# Patient Record
Sex: MALE | Race: OTHER | Employment: STUDENT | URBAN - METROPOLITAN AREA
[De-identification: names, ages, dates, MRNs, and addresses within clinical notes are randomized per-mention and may not be internally consistent; named-entity substitution may affect disease eponyms.]

---
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
## 2019-06-14 ENCOUNTER — OFFICE VISIT (OUTPATIENT)
Dept: FAMILY MEDICINE CLINIC | Age: 19
End: 2019-06-14
Payer: COMMERCIAL

## 2019-06-14 VITALS
TEMPERATURE: 97.9 F | BODY MASS INDEX: 36.32 KG/M2 | OXYGEN SATURATION: 99 % | HEIGHT: 67 IN | DIASTOLIC BLOOD PRESSURE: 64 MMHG | WEIGHT: 231.4 LBS | HEART RATE: 45 BPM | SYSTOLIC BLOOD PRESSURE: 110 MMHG

## 2019-06-14 DIAGNOSIS — M54.9 UPPER BACK PAIN ON LEFT SIDE: ICD-10-CM

## 2019-06-14 DIAGNOSIS — J02.9 SORE THROAT: Primary | ICD-10-CM

## 2019-06-14 LAB — S PYO AG THROAT QL: NORMAL

## 2019-06-14 PROCEDURE — 87880 STREP A ASSAY W/OPTIC: CPT | Performed by: NURSE PRACTITIONER

## 2019-06-14 PROCEDURE — 99212 OFFICE O/P EST SF 10 MIN: CPT | Performed by: NURSE PRACTITIONER

## 2019-06-14 ASSESSMENT — ENCOUNTER SYMPTOMS
ABDOMINAL PAIN: 1
SHORTNESS OF BREATH: 0
DIARRHEA: 0
SORE THROAT: 1
CONSTIPATION: 1
COUGH: 1

## 2019-06-14 NOTE — PROGRESS NOTES
Subjective  Kirstenkarla Patel, 19 y.o. male presents today with:  Chief Complaint   Patient presents with    Pharyngitis     Pt states three days ago woke up with a sore and dry  throat, thats progressively getting worse. Pt states he works at [employer] and has to talk a lot which has made him worse now it hurts to talk and when he has to talk it makes him gag. Pt state he vomited yesterday. Pt states he also has a headace on and off, nausea, diarrhea, sore muscles, and a dry cough. Pt has taken antibacterial spray, cough drops and sudafed. 18 y.o.male presents w/ sore throat. Onset 2d ago. Symptoms include dry/itchy throat and pain w/ swallowing. States symptoms have worsened to the point of vomiting x 1 yesterday - undigested food, non-bloody, non-bilious. Aggravating factors are talking. Pt states he got a cat recently, unsure if he is allergic. 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. Hx of T&A in childhood   Denies hx of seasonal allergies  Occasional cigarette smoking  At break room at work he drinks from drinking fountain, states the water tastes like chemicals. He questions whether this is related to sore throat symptoms    Review of Systems   Constitutional: Negative for chills and fever. HENT: Positive for congestion and sore throat. Negative for ear pain. Respiratory: Positive for cough. Negative for choking and shortness of breath. Cardiovascular: Negative for palpitations. Gastrointestinal: Positive for abdominal pain (\"my stomach doesn't like me very much\"), constipation (not new) and vomiting. Negative for diarrhea and nausea. Genitourinary: Negative for dysuria. Musculoskeletal: Positive for back pain. Negative for arthralgias and gait problem. Skin: Negative for rash. Neurological: Positive for headaches. Negative for dizziness and light-headedness. Hematological: Negative for adenopathy.      Objective  Vitals:    06/14/19 1258   BP: 110/64   Pulse: (!) 45   Temp: 97.9 °F (36.6 °C)   SpO2: 99%   Weight: 231 lb 6.4 oz (105 kg)   Height: 5' 7\" (1.702 m)       Physical Exam   Constitutional: He is oriented to person, place, and time. He appears well-developed and well-nourished. He is cooperative. Non-toxic appearance. HENT:   Head: Normocephalic and atraumatic. Right Ear: Hearing, tympanic membrane, external ear and ear canal normal.   Left Ear: Hearing, tympanic membrane, external ear and ear canal normal.   Nose: Mucosal edema (pale mucosa) present. No sinus tenderness. Mouth/Throat: Uvula is midline and mucous membranes are normal. No trismus in the jaw. Posterior oropharyngeal erythema (+ cobblestoning) present. No oropharyngeal exudate or posterior oropharyngeal edema. Tonsils are 0 on the right. Tonsils are 0 on the left. Eyes: Pupils are equal, round, and reactive to light. Conjunctivae, EOM and lids are normal.   Neck: Normal range of motion and phonation normal. Neck supple. Cardiovascular: Normal rate, regular rhythm, S1 normal and S2 normal.   Pulmonary/Chest: Effort normal and breath sounds normal. No respiratory distress. He has no wheezes. He has no rales. He exhibits no tenderness. Abdominal: Soft. Normal appearance and bowel sounds are normal. There is no hepatosplenomegaly. There is no tenderness. There is no CVA tenderness. No hernia. Musculoskeletal: Normal range of motion. Right shoulder: Normal.        Left shoulder: Normal.        Cervical back: Normal.        Thoracic back: Normal.   Lymphadenopathy:     He has no cervical adenopathy. Neurological: He is alert and oriented to person, place, and time. He has normal strength. No sensory deficit. Coordination and gait normal.   Skin: Skin is warm, dry and intact. No rash noted. Psychiatric: He has a normal mood and affect. His speech is normal and behavior is normal.   Vitals reviewed.     POC Testing Today:   Results for POC orders placed in visit on 06/14/19   POCT rapid strep A   Result Value Ref Range    Strep A Ag None Detected None Detected     Assessment & Plan   Negative rapid strep: Culture sent, will call if positive and prescribe antibiotics, otherwise assume viral infection and manage with supportive care. No back pain red flags on history or physical. Likely musculoskeletal in nature. Diagnoses and all orders for this visit:    Sore throat  -     POCT rapid strep A  -     Throat culture; Future  - OTC acetaminophen/ibuprofen prn pain, salt water gargles, tea w/ honey  - Drink plenty of fluids, vocal rest   - Consider antihistamine   - Call/return if symptoms fail to improve as anticipated or worsen in any way    Upper back pain on left side      - OTC acetaminophen/ibuprofen prn pain      - Gentle stretching/massage as tolerated      - Squat when lifting - do not bend at waist to lift       - Return/see PCP if symptoms fail to improve as anticipated or worsen in any way    Return if symptoms worsen or fail to improve, for Follow-up with your Primary Care Provider. Side effects and adverse effects of any medication prescribed today, as well as treatment plan/rationale, follow-up care, and result expectations have been discussed with the patient. Lexis Uriostegui expresses understanding and wishes to proceed with the plan. Discussed signs and symptoms which require immediate follow-up in ED/call to 911. Understanding verbalized. I have reviewed and updated the electronic medical record.     FRANKIE Iglesias - CNP

## 2019-06-14 NOTE — PATIENT INSTRUCTIONS
We'll with throat culture results  Drink plenty of fluids  Consider antihistamine  Vocal rest    The following comfort measures might be helpful:   Adequate rest and hydration    Over the counter pain relievers/ fever reducers as needed   For sore throat: 2 Tbsp honey mixed w/warm tea or water or warm salt water gargles (1/2 teaspoon in 8oz H20)    For congestion: Vaporizer, humidifier, steamy showers, saline nasal spray, warm facial packs, sleeping w/ head of bed elevated    Prevention measures for upper respiratory infections might include:   Avoid unfavorable environmental factors such as allergens, cigarette smoke, pollution as applicable   Frequent hand washing, cover your cough    Cover coughs and sneezes with the elbow or sleeve, not the hand     Patient Education   Sore Throat: Care Instructions  Your Care Instructions    Infection by bacteria or a virus causes most sore throats. Cigarette smoke, dry air, air pollution, allergies, and yelling can also cause a sore throat. Sore throats can be painful and annoying. Fortunately, most sore throats go away on their own. If you have a bacterial infection, your doctor may prescribe antibiotics. Follow-up care is a key part of your treatment and safety. Be sure to make and go to all appointments, and call your doctor if you are having problems. It's also a good idea to know your test results and keep a list of the medicines you take. How can you care for yourself at home? · If your doctor prescribed antibiotics, take them as directed. Do not stop taking them just because you feel better. You need to take the full course of antibiotics. · Gargle with warm salt water once an hour to help reduce swelling and relieve discomfort. Use 1 teaspoon of salt mixed in 1 cup of warm water. · Take an over-the-counter pain medicine, such as acetaminophen (Tylenol), ibuprofen (Advil, Motrin), or naproxen (Aleve). Read and follow all instructions on the label.   · Be careful when taking over-the-counter cold or flu medicines and Tylenol at the same time. Many of these medicines have acetaminophen, which is Tylenol. Read the labels to make sure that you are not taking more than the recommended dose. Too much acetaminophen (Tylenol) can be harmful. · Drink plenty of fluids. Fluids may help soothe an irritated throat. Hot fluids, such as tea or soup, may help decrease throat pain. · Use over-the-counter throat lozenges to soothe pain. Regular cough drops or hard candy may also help. These should not be given to young children because of the risk of choking. · Do not smoke or allow others to smoke around you. If you need help quitting, talk to your doctor about stop-smoking programs and medicines. These can increase your chances of quitting for good. · Use a vaporizer or humidifier to add moisture to your bedroom. Follow the directions for cleaning the machine. When should you call for help? Call your doctor now or seek immediate medical care if:    · You have new or worse trouble swallowing.     · Your sore throat gets much worse on one side.    Watch closely for changes in your health, and be sure to contact your doctor if you do not get better as expected. Where can you learn more? Go to https://[email or web address].compeHotreadereb.Keas. org and sign in to your [employer] account. Enter K966 in the [facility] box to learn more about \"Sore Throat: Care Instructions. \"     If you do not have an account, please click on the \"Sign Up Now\" link. Current as of: October 21, 2018  Content Version: 12.0  © 3765-8996 Healthwise, Incorporated. Care instructions adapted under license by [facility] ([facility]). If you have questions about a medical condition or this instruction, always ask your healthcare professional. [address] any warranty or liability for your use of this information.

## 2019-06-14 NOTE — LETTER
[address] [place] [address], [facility] [address]  [place] [id number]  Phone: [phone]  Fax: [phone]    FRANKIE Toledo - CNP        June 14, 2019     Patient: Debbie Partida   YOB: 2000   Date of Visit: 6/14/2019       To Whom it May Concern:    Debbie Partida was seen in my clinic on 6/14/2019. Please excuse his absence Saturday, June 15th. He may return to work on Sunday, June 16, 2019 if symptoms have improved. If you have any questions or concerns, please don't hesitate to call.     Sincerely,         Cece Jones DNP, APRN - CNP

## 2019-06-16 DIAGNOSIS — J02.9 SORE THROAT: ICD-10-CM

## 2019-06-18 LAB — THROAT CULTURE: NORMAL

## 2019-06-24 ASSESSMENT — ENCOUNTER SYMPTOMS
VOMITING: 1
BACK PAIN: 1
CHOKING: 0
NAUSEA: 0

## 2019-07-07 ENCOUNTER — OFFICE VISIT (OUTPATIENT)
Dept: FAMILY MEDICINE CLINIC | Age: 19
End: 2019-07-07
Payer: COMMERCIAL

## 2019-07-07 VITALS
HEIGHT: 67 IN | SYSTOLIC BLOOD PRESSURE: 122 MMHG | WEIGHT: 229.2 LBS | DIASTOLIC BLOOD PRESSURE: 78 MMHG | TEMPERATURE: 97.2 F | HEART RATE: 68 BPM | BODY MASS INDEX: 35.97 KG/M2 | OXYGEN SATURATION: 98 %

## 2019-07-07 DIAGNOSIS — H66.011 NON-RECURRENT ACUTE SUPPURATIVE OTITIS MEDIA OF RIGHT EAR WITH SPONTANEOUS RUPTURE OF TYMPANIC MEMBRANE: Primary | ICD-10-CM

## 2019-07-07 PROCEDURE — 99213 OFFICE O/P EST LOW 20 MIN: CPT | Performed by: NURSE PRACTITIONER

## 2019-07-07 RX ORDER — CEFDINIR 300 MG/1
300 CAPSULE ORAL 2 TIMES DAILY
Qty: 14 CAPSULE | Refills: 0 | Status: SHIPPED | OUTPATIENT
Start: 2019-07-07 | End: 2019-07-19 | Stop reason: SDUPTHER

## 2019-07-07 RX ORDER — OFLOXACIN 3 MG/ML
SOLUTION AURICULAR (OTIC)
Qty: 7 ML | Refills: 0 | Status: SHIPPED | OUTPATIENT
Start: 2019-07-07 | End: 2019-07-19

## 2019-07-07 ASSESSMENT — ENCOUNTER SYMPTOMS
COUGH: 1
RHINORRHEA: 1

## 2019-07-07 NOTE — PATIENT INSTRUCTIONS
the ear. ? A fever.    Watch closely for changes in your health, and be sure to contact your doctor if:    · You have changes in hearing.     · You do not get better as expected. Where can you learn more? Go to https://chpebriteweb.Libratone. org and sign in to your [employer] account. Enter F313 in the [employer] box to learn more about \"Perforated Eardrum: Care Instructions. \"     If you do not have an account, please click on the \"Sign Up Now\" link. Current as of: October 21, 2018  Content Version: 12.0  © 5475-0433 Healthwise, Incorporated. Care instructions adapted under license by [facility] ([facility]). If you have questions about a medical condition or this instruction, always ask your healthcare professional. [address] any warranty or liability for your use of this information.

## 2019-07-19 ENCOUNTER — OFFICE VISIT (OUTPATIENT)
Dept: FAMILY MEDICINE CLINIC | Age: 19
End: 2019-07-19
Payer: COMMERCIAL

## 2019-07-19 VITALS
HEIGHT: 67 IN | WEIGHT: 234 LBS | OXYGEN SATURATION: 98 % | DIASTOLIC BLOOD PRESSURE: 70 MMHG | RESPIRATION RATE: 14 BRPM | SYSTOLIC BLOOD PRESSURE: 122 MMHG | BODY MASS INDEX: 36.73 KG/M2 | HEART RATE: 50 BPM | TEMPERATURE: 97.5 F

## 2019-07-19 DIAGNOSIS — H66.011 NON-RECURRENT ACUTE SUPPURATIVE OTITIS MEDIA OF RIGHT EAR WITH SPONTANEOUS RUPTURE OF TYMPANIC MEMBRANE: ICD-10-CM

## 2019-07-19 DIAGNOSIS — F41.9 ANXIETY: ICD-10-CM

## 2019-07-19 DIAGNOSIS — F90.9 ATTENTION DEFICIT HYPERACTIVITY DISORDER (ADHD), UNSPECIFIED ADHD TYPE: Primary | ICD-10-CM

## 2019-07-19 PROCEDURE — 99214 OFFICE O/P EST MOD 30 MIN: CPT | Performed by: INTERNAL MEDICINE

## 2019-07-19 PROCEDURE — 4004F PT TOBACCO SCREEN RCVD TLK: CPT | Performed by: INTERNAL MEDICINE

## 2019-07-19 PROCEDURE — G8427 DOCREV CUR MEDS BY ELIG CLIN: HCPCS | Performed by: INTERNAL MEDICINE

## 2019-07-19 PROCEDURE — G8417 CALC BMI ABV UP PARAM F/U: HCPCS | Performed by: INTERNAL MEDICINE

## 2019-07-19 RX ORDER — CEFDINIR 300 MG/1
300 CAPSULE ORAL 2 TIMES DAILY
Qty: 14 CAPSULE | Refills: 0 | Status: SHIPPED | OUTPATIENT
Start: 2019-07-19 | End: 2019-07-26

## 2019-07-19 ASSESSMENT — ENCOUNTER SYMPTOMS
EYE REDNESS: 0
SHORTNESS OF BREATH: 0
SORE THROAT: 0
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
## 2019-07-25 ASSESSMENT — ENCOUNTER SYMPTOMS
VOMITING: 0
ABDOMINAL PAIN: 0
PHOTOPHOBIA: 0
NAUSEA: 0
CHOKING: 0
TROUBLE SWALLOWING: 0
VOICE CHANGE: 0
SHORTNESS OF BREATH: 0

## 2019-07-26 ENCOUNTER — OFFICE VISIT (OUTPATIENT)
Dept: BEHAVIORAL/MENTAL HEALTH CLINIC | Age: 19
End: 2019-07-26
Payer: COMMERCIAL

## 2019-07-26 DIAGNOSIS — F34.1 PERSISTENT DEPRESSIVE DISORDER WITH ANXIOUS DISTRESS, CURRENTLY MODERATE: Primary | ICD-10-CM

## 2019-07-26 PROCEDURE — 4004F PT TOBACCO SCREEN RCVD TLK: CPT | Performed by: PSYCHOLOGIST

## 2019-07-26 PROCEDURE — 90791 PSYCH DIAGNOSTIC EVALUATION: CPT | Performed by: PSYCHOLOGIST

## 2019-07-26 ASSESSMENT — PATIENT HEALTH QUESTIONNAIRE - PHQ9
SUM OF ALL RESPONSES TO PHQ QUESTIONS 1-9: 12
1. LITTLE INTEREST OR PLEASURE IN DOING THINGS: 1
2. FEELING DOWN, DEPRESSED OR HOPELESS: 2
4. FEELING TIRED OR HAVING LITTLE ENERGY: 2
3. TROUBLE FALLING OR STAYING ASLEEP: 2
9. THOUGHTS THAT YOU WOULD BE BETTER OFF DEAD, OR OF HURTING YOURSELF: 0
6. FEELING BAD ABOUT YOURSELF - OR THAT YOU ARE A FAILURE OR HAVE LET YOURSELF OR YOUR FAMILY DOWN: 2
10. IF YOU CHECKED OFF ANY PROBLEMS, HOW DIFFICULT HAVE THESE PROBLEMS MADE IT FOR YOU TO DO YOUR WORK, TAKE CARE OF THINGS AT HOME, OR GET ALONG WITH OTHER PEOPLE: 2
7. TROUBLE CONCENTRATING ON THINGS, SUCH AS READING THE NEWSPAPER OR WATCHING TELEVISION: 1
5. POOR APPETITE OR OVEREATING: 2
SUM OF ALL RESPONSES TO PHQ9 QUESTIONS 1 & 2: 3
8. MOVING OR SPEAKING SO SLOWLY THAT OTHER PEOPLE COULD HAVE NOTICED. OR THE OPPOSITE, BEING SO FIGETY OR RESTLESS THAT YOU HAVE BEEN MOVING AROUND A LOT MORE THAN USUAL: 0
SUM OF ALL RESPONSES TO PHQ QUESTIONS 1-9: 12

## 2019-07-26 NOTE — PATIENT INSTRUCTIONS
1. Dedicate 5 minutes 3x/day to practice the deep breathing    2. Review the list of apps and give some a try! [facility] Box, CBTi  and progressive muscle relaxation for sleep, etc.)    3. Read the below information about stress management    4. Return in about 2-3 weeks    \"The entire autonomic nervous system (and through it, our internal organs and glands) is largely driven by our breathing patterns. By changing our breathing we can influence millions of biochemical reactions in our body, producing more relaxing substances such as endorphins and fewer anxiety-producing ones like adrenaline and higher blood acidity. Mindfulness of the breath is so effective that it is common to all meditative and prayer traditions. \" Anxiety Fear & Breathing - Breathing. com    \"When overcoming high levels of anxiety, it is important to learn the techniques of correct breathing. Many people who live with high levels of anxiety are known to breathe through their chest. Shallow breathing through the chest means you are disrupting the balance of oxygen and carbon dioxide necessary to be in a relaxed state. This type of breathing will perpetuate the symptoms of anxiety. \" [employer]Place. com      Diaphragmatic Breathing             _____________________________________________________________________________  1. Sit in a comfortable position    2. Place one hand on your stomach and the other on your chest    3. Try to breathe so that only your stomach rises and falls    As you inhale, concentrate on your chest remaining relatively still while your stomach rises. It may be helpful for you to imagine that your pants are too big and you need to push your stomach out to hold them up. When exhaling, allow your stomach to fall in and the air to fully escape. Inhale slowly. You may choose to hold the air in for about a second. Exhale slowly.   Dont push the air out, but just let the natural pressure of your body slowly move it the fermins guided breathing instruction. Mable Cuevas learn how to breathe deeply, hold, and then release with better control. If it works for you, you can purchase the full course which gives you access to the entire program.  Breathing Zone   Platform: [employer] & Android  Cost: $3.99   Breathing Zone uses a clinically proven therapeutic breathing exercise that decreases your heart rate, and with daily use can help manage high blood pressure.    ----------------------------------------------  Self-Help for Anxiety Management (ROMEO)  Platform: Lien [employer]one & Android  Cost: Free  The Self-Help for Anxiety Management (ROMEO) fermin from the [employer] can help you regain control of your anxiety and emotions. Tell the fermin how youre feeling, how anxious you are, or how worried you are. Then let the fermins self-help features walk you through some calming or relaxation practices. If you want, you can connect with a social network of other [facility] users. Dont worry, the network isnt connected to larger networks like Twitter or [employer]. Stop Panic & Anxiety Help  Platform: Android  Cost: Free  If panic and anxiety attacks have a  on your life, this fermin might help you let them go. The Stop Panic & Anxiety Help Android fermin uses emotion and relaxation training audio tracks to help you fight your fears and find a state of calm. When youve overcome the attack, use the 51 Auto journal to record what caused the attack and how you were able to get through it. Then use this journal to learn from your experiences and prepare for the future. I Can Be Fearless by Human Progress  Platform: [employer]  Cost: Free  When you were younger, your parents might have told you that you could do anything you put your mind to. This fermin might not help you be an astronaut or a world famous actress, but it can help you break through your anxiety, fears, and worries to a place of calm and confidence.  Open your Apple device and select what you want than 20 years of experience. In addition to viewing Dr. Oneil Degroot videos, you can read a variety of articles related to anxiety, meditation, and stress management. Anxiety Free  Platform: [employer]   Cost: Free  Meditation requires mindfulness and a sense of presence in your thoughts. Hypnosis is one step beyond meditation. It works by sending signals to your brain and transforming it almost unknowingly. The creators of this fermin say that its audio recordings contain subliminal signals that speak to the subconscious with powerful effect.  Hypnosis, like meditation, requires practice, and the goal is to get each user to a place where self-hypnosis is possible in order to reduce anxiety. Relax & Rest Guided Meditations  Platform: [employer] and [employer]  Cost: $0.99  While group meetings and discussions are always an option, some people find relaxation more easily on their own. This fermin lets you relax in the space of your own home or office with three guided meditations. Breath Awareness Guided Meditation (5 minutes), Deep Rested Guided Meditation (13 minutes), and Whole Body Guided Relaxation (24 minutes) are designed specifically to help you relax and sink into a peaceful meditation moment. Equanimity - Meditation Timer & Tracker  Platform: [employer]   Cost: $4.99  Meditation is one way you can cope with the symptoms and side effects of anxiety. People who meditate can eventually train themselves to stay calm when feeling stressed or anxious. Equanimity - Meditation Timer & Tracker is simple and straightforward. Time each session and watch for visual light cues to let you know how long youve been meditating. Take notes about each session, and watch your progress as you learn to manage your stress and anxiety.   Virtual Hope Box  Platform: iPhone and Android  Cost: Free  The Automatic Data Box (VHB) is a smartphone application that contains simple tools to help with coping, relaxation, distraction, and positive thinking via

## 2020-08-10 ENCOUNTER — NURSE TRIAGE (OUTPATIENT)
Dept: OTHER | Facility: CLINIC | Age: 20
End: 2020-08-10

## 2020-08-10 NOTE — TELEPHONE ENCOUNTER
Reason for Disposition   MILD diarrhea (e.g., 1-3 or more stools than normal in past 24 hours) diarrhea without known cause and present > 7 days    Protocols used: DIARRHEA-ADULT-OH    Received call from [facility]. Caller reports abdominal cramping and diarrhea for the past 10 days. Caller reports after he eats is when he experiences this discomfort. Provided care advice to help with symptoms. Call soft transferred to [address] to schedule appointment. Please do not reply to the triage nurse through this encounter. Any subsequent communication should be directly with the patient.

## 2020-08-11 ENCOUNTER — VIRTUAL VISIT (OUTPATIENT)
Dept: PRIMARY CARE CLINIC | Age: 20
End: 2020-08-11
Payer: COMMERCIAL

## 2020-08-11 DIAGNOSIS — K52.9 ACUTE GASTROENTERITIS: ICD-10-CM

## 2020-08-11 LAB
ALBUMIN SERPL-MCNC: 4.8 G/DL (ref 3.5–4.6)
ALP BLD-CCNC: 60 U/L (ref 35–104)
ALT SERPL-CCNC: 28 U/L (ref 0–41)
ANION GAP SERPL CALCULATED.3IONS-SCNC: 10 MEQ/L (ref 9–15)
AST SERPL-CCNC: 18 U/L (ref 0–40)
BILIRUB SERPL-MCNC: 0.5 MG/DL (ref 0.2–0.7)
BUN BLDV-MCNC: 9 MG/DL (ref 6–20)
CALCIUM SERPL-MCNC: 9.3 MG/DL (ref 8.5–9.9)
CHLORIDE BLD-SCNC: 104 MEQ/L (ref 95–107)
CO2: 27 MEQ/L (ref 20–31)
CREAT SERPL-MCNC: 0.8 MG/DL (ref 0.7–1.2)
GFR AFRICAN AMERICAN: >60
GFR NON-AFRICAN AMERICAN: >60
GLOBULIN: 2.8 G/DL (ref 2.3–3.5)
GLUCOSE BLD-MCNC: 110 MG/DL (ref 70–99)
HCT VFR BLD CALC: 44.4 % (ref 42–52)
HEMOGLOBIN: 15.4 G/DL (ref 14–18)
MCH RBC QN AUTO: 30.9 PG (ref 27–31.3)
MCHC RBC AUTO-ENTMCNC: 34.8 % (ref 33–37)
MCV RBC AUTO: 88.8 FL (ref 80–100)
PDW BLD-RTO: 12.3 % (ref 11.5–14.5)
PLATELET # BLD: 262 K/UL (ref 130–400)
POTASSIUM SERPL-SCNC: 4.1 MEQ/L (ref 3.4–4.9)
RBC # BLD: 5 M/UL (ref 4.7–6.1)
SODIUM BLD-SCNC: 141 MEQ/L (ref 135–144)
TOTAL PROTEIN: 7.6 G/DL (ref 6.3–8)
WBC # BLD: 7 K/UL (ref 4.8–10.8)

## 2020-08-11 PROCEDURE — 99442 PR PHYS/QHP TELEPHONE EVALUATION 11-20 MIN: CPT | Performed by: INTERNAL MEDICINE

## 2020-08-11 NOTE — PROGRESS NOTES
Na Dunbar is a 23 y.o. male evaluated via telephone on 8/11/2020. Consent:  He and/or health care decision maker is aware that that he may receive a bill for this telephone service, depending on his insurance coverage, and has provided verbal consent to proceed: Yes    Documentation:  I communicated with the patient and/or health care decision maker. Details of this discussion including any medical advice provided:    Diarrhea x 11 days  Pt complains of non bloody watery stools occurring 3-5 times per day. Assoc nausea, no vomiting and generalized abd soreness. No fever or chills, no constipation. Claims to have consumed recalled Oyster onions with his dad who also had a milder form of diarrhea. No recent antibiotic use. Assoc muscle weakness and cramps. Has been consuming only water for replacement. Pt encouraged to drink Gatorade. I affirm this is a Patient Initiated Episode with a Patient who has not had a related appointment within my department in the past 7 days or scheduled within the next 24 hours.     Patient identification was verified at the start of the visit: Yes    Total Time: minutes: 11-20 minutes    Note: not billable if this call serves to triage the patient into an appointment for the relevant concern    [facility]

## 2021-10-01 ENCOUNTER — OFFICE VISIT (OUTPATIENT)
Dept: PRIMARY CARE CLINIC | Age: 21
End: 2021-10-01
Payer: COMMERCIAL

## 2021-10-01 VITALS
HEART RATE: 92 BPM | HEIGHT: 67 IN | BODY MASS INDEX: 35.47 KG/M2 | WEIGHT: 226 LBS | SYSTOLIC BLOOD PRESSURE: 158 MMHG | DIASTOLIC BLOOD PRESSURE: 94 MMHG | OXYGEN SATURATION: 98 %

## 2021-10-01 DIAGNOSIS — F40.01 AGORAPHOBIA WITH PANIC ATTACKS: Primary | ICD-10-CM

## 2021-10-01 DIAGNOSIS — Z00.00 PREVENTATIVE HEALTH CARE: ICD-10-CM

## 2021-10-01 DIAGNOSIS — E03.9 HYPOTHYROIDISM, UNSPECIFIED TYPE: ICD-10-CM

## 2021-10-01 DIAGNOSIS — E78.5 HYPERLIPIDEMIA, UNSPECIFIED HYPERLIPIDEMIA TYPE: ICD-10-CM

## 2021-10-01 DIAGNOSIS — A64 STD (MALE): ICD-10-CM

## 2021-10-01 DIAGNOSIS — F32.A DEPRESSION, UNSPECIFIED DEPRESSION TYPE: ICD-10-CM

## 2021-10-01 PROCEDURE — 99214 OFFICE O/P EST MOD 30 MIN: CPT | Performed by: INTERNAL MEDICINE

## 2021-10-01 RX ORDER — FLUOXETINE 10 MG/1
10 CAPSULE ORAL DAILY
Qty: 30 CAPSULE | Refills: 5 | Status: SHIPPED | OUTPATIENT
Start: 2021-10-01 | End: 2021-10-26 | Stop reason: SDUPTHER

## 2021-10-01 RX ORDER — ONDANSETRON 8 MG/1
TABLET, ORALLY DISINTEGRATING ORAL
COMMUNITY
Start: 2021-08-26 | End: 2022-01-31

## 2021-10-01 SDOH — ECONOMIC STABILITY: TRANSPORTATION INSECURITY
IN THE PAST 12 MONTHS, HAS LACK OF TRANSPORTATION KEPT YOU FROM MEETINGS, WORK, OR FROM GETTING THINGS NEEDED FOR DAILY LIVING?: NO

## 2021-10-01 SDOH — ECONOMIC STABILITY: FOOD INSECURITY: WITHIN THE PAST 12 MONTHS, THE FOOD YOU BOUGHT JUST DIDN'T LAST AND YOU DIDN'T HAVE MONEY TO GET MORE.: NEVER TRUE

## 2021-10-01 SDOH — ECONOMIC STABILITY: TRANSPORTATION INSECURITY
IN THE PAST 12 MONTHS, HAS THE LACK OF TRANSPORTATION KEPT YOU FROM MEDICAL APPOINTMENTS OR FROM GETTING MEDICATIONS?: NO

## 2021-10-01 SDOH — ECONOMIC STABILITY: FOOD INSECURITY: WITHIN THE PAST 12 MONTHS, YOU WORRIED THAT YOUR FOOD WOULD RUN OUT BEFORE YOU GOT MONEY TO BUY MORE.: NEVER TRUE

## 2021-10-01 ASSESSMENT — ENCOUNTER SYMPTOMS
TROUBLE SWALLOWING: 0
VOMITING: 0
CHOKING: 0
PHOTOPHOBIA: 0
VOICE CHANGE: 0
NAUSEA: 0
SHORTNESS OF BREATH: 0

## 2021-10-01 ASSESSMENT — SOCIAL DETERMINANTS OF HEALTH (SDOH): HOW HARD IS IT FOR YOU TO PAY FOR THE VERY BASICS LIKE FOOD, HOUSING, MEDICAL CARE, AND HEATING?: NOT HARD AT ALL

## 2021-10-01 NOTE — PROGRESS NOTES
Peter Reed 19 y.o. male presents today with   Chief Complaint   Patient presents with    Anxiety     unable to speak to people on the job. Recently quit smoking marijuana to get better job    Sexually Transmitted Diseases     would like to be tested       Sexually Transmitted Diseases  Pertinent negatives include no chest pain, fever, nausea, rash, shortness of breath, urgency or vomiting. Mental Health Problem  The primary symptoms include dysphoric mood and somatic symptoms. The current episode started more than 1 month ago. This is a recurrent problem. Somatic symptoms do not include fatigue. The onset of the illness is precipitated by emotional stress and a stressful event. The degree of incapacity that he is experiencing as a consequence of his illness is moderate. Additional symptoms of the illness include anhedonia, insomnia, agitation and attention impairment. Additional symptoms of the illness do not include fatigue. He does not admit to suicidal ideas. He does not contemplate harming himself. Risk factors that are present for mental illness include a history of mental illness. inbetween relationship    Past Medical History:   Diagnosis Date    (PE) tubes X 5 9/2/2015    Acne vulgaris 10/14/2015    AGE (acute gastroenteritis), Resolved 6/30/2014    Allergic rhinitis 5/19/2014    Depression 10/14/2015    Eczema 5/19/2014    Headache 3/16/2016    LVH (left ventricular hypertrophy) 5/19/2014    LVH, Cardiac w/u neg. 5/19/2014    Nodule, ? Small Abscess 3/3/2015    Noncompliance, hasn't made appt w/ Psychiatry yet. Refuses antidepressants 11/9/2017    Noncompliance, hasn't picked up Lexapro yet or made appt w/ Psychiatry yet 10/21/2015    Patent pressure equalisation (PE) tube, left 9/3/2015    Scrotal mass, normal now w/ Normal exam 3/16/2016     Patient Active Problem List    Diagnosis Date Noted    Noncompliance, hasn't made appt w/ Psychiatry yet.  Refuses antidepressants 11/09/2017    Scrotal mass, normal now w/ Normal exam 03/16/2016    Headache 03/16/2016    Acne vulgaris 10/14/2015    Depression 10/14/2015    Patent pressure equalisation (PE) tube, left 09/03/2015    (PE) tubes X 5 09/02/2015    Nodule, ? Small Abscess 03/03/2015    AGE (acute gastroenteritis), Resolved 06/30/2014    Allergic rhinitis 05/19/2014    Eczema 05/19/2014    LVH, Cardiac w/u neg. 05/19/2014     Past Surgical History:   Procedure Laterality Date    COLONOSCOPY  12/31/2014    DR Mitchel Casey     No family history on file. Social History     Socioeconomic History    Marital status: Single     Spouse name: None    Number of children: None    Years of education: None    Highest education level: None   Occupational History    None   Tobacco Use    Smoking status: Current Every Day Smoker    Smokeless tobacco: Never Used   Substance and Sexual Activity    Alcohol use: No    Drug use: No    Sexual activity: Yes     Partners: Female   Other Topics Concern    None   Social History Narrative    None     Social Determinants of Health     Financial Resource Strain: Low Risk     Difficulty of Paying Living Expenses: Not hard at all   Food Insecurity: No Food Insecurity    Worried About Running Out of Food in the Last Year: Never true    [address] in the Last Year: Never true   Transportation Needs: No Transportation Needs    Lack of Transportation (Medical): No    Lack of Transportation (Non-Medical):  No   Physical Activity:     Days of Exercise per Week:     Minutes of Exercise per Session:    Stress:     Feeling of Stress :    Social Connections:     Frequency of Communication with Friends and Family:     Frequency of Social Gatherings with Friends and Family:     Attends Shinto Services:     Active Member of Clubs or Organizations:     Attends Club or Organization Meetings:     Marital Status:    Intimate Partner Violence:     Fear of Current or Ex-Partner:     Emotionally Abused:     Physically Abused:     Sexually Abused: Allergies   Allergen Reactions    Lactose Intolerance (Gi) Diarrhea    Penicillins Rash       Review of Systems   Constitutional: Negative for fatigue and fever. HENT: Negative for trouble swallowing and voice change. Eyes: Negative for photophobia and visual disturbance. Respiratory: Negative for choking and shortness of breath. Cardiovascular: Negative for chest pain and palpitations. Gastrointestinal: Negative for nausea and vomiting. Genitourinary: Negative for decreased urine volume and urgency. Skin: Negative for rash. Neurological: Negative for tremors and syncope. Hematological: Does not bruise/bleed easily. Psychiatric/Behavioral: Positive for agitation, decreased concentration, dysphoric mood and sleep disturbance. Negative for suicidal ideas. The patient is nervous/anxious and has insomnia. Vitals:    10/01/21 1406   BP: (!) 158/94   Site: Left Upper Arm   Cuff Size: Large Adult   Pulse: 92   SpO2: 98%   Weight: 226 lb (102.5 kg)   Height: 5' 7\" (1.702 m)       Physical Exam  Constitutional:       Appearance: He is well-developed. HENT:      Head: Normocephalic and atraumatic. Eyes:      Conjunctiva/sclera: Conjunctivae normal.      Pupils: Pupils are equal, round, and reactive to light. Cardiovascular:      Rate and Rhythm: Normal rate and regular rhythm. Pulmonary:      Effort: Pulmonary effort is normal. No respiratory distress. Breath sounds: No wheezing. Abdominal:      General: Bowel sounds are normal. There is no distension. Tenderness: There is no abdominal tenderness. Musculoskeletal:         General: Normal range of motion. Cervical back: Normal range of motion. Skin:     General: Skin is warm. Coloration: Skin is not jaundiced. Neurological:      Mental Status: He is alert. Cranial Nerves: No cranial nerve deficit.    Psychiatric:         Mood and Affect: Mood normal.       Assessment/Plan  Franco Pepper was seen today for anxiety and sexually transmitted diseases. Diagnoses and all orders for this visit:    Agoraphobia with panic attacks  -     FLUoxetine (PROZAC) 10 MG capsule; Take 1 capsule by mouth daily  -     Ambulatory referral to Psychology    Depression, unspecified depression type  -     CBC With Auto Differential; Future  -     FLUoxetine (PROZAC) 10 MG capsule; Take 1 capsule by mouth daily  -     Ambulatory referral to Psychology    Hypothyroidism, unspecified type  -     TSH with Reflex; Future  -     T4, Free; Future    Hyperlipidemia, unspecified hyperlipidemia type  -     Lipid, Fasting; Future  -     Comprehensive Metabolic Panel; Future    Preventative health care  -     Lipid, Fasting; Future  -     CBC With Auto Differential; Future  -     Comprehensive Metabolic Panel; Future    STD (male)  -     HIV Screen; Future  -     C.trachomatis N.gonorrhoeae DNA, Urine; Future  -     Rpr; Future  -     Hepatitis C Antibody; Future        Return in about 3 months (around 1/1/2022), or if symptoms worsen or fail to improve.     Leyla Mcclendon MD

## 2021-10-26 ENCOUNTER — TELEPHONE (OUTPATIENT)
Dept: PRIMARY CARE CLINIC | Age: 21
End: 2021-10-26

## 2021-10-26 DIAGNOSIS — F32.A DEPRESSION, UNSPECIFIED DEPRESSION TYPE: ICD-10-CM

## 2021-10-26 DIAGNOSIS — F40.01 AGORAPHOBIA WITH PANIC ATTACKS: ICD-10-CM

## 2021-10-26 RX ORDER — FLUOXETINE HYDROCHLORIDE 20 MG/1
20 CAPSULE ORAL DAILY
Qty: 30 CAPSULE | Refills: 2 | Status: SHIPPED | OUTPATIENT
Start: 2021-10-26 | End: 2022-01-18 | Stop reason: SDUPTHER

## 2022-01-18 ENCOUNTER — TELEPHONE (OUTPATIENT)
Dept: PRIMARY CARE CLINIC | Age: 22
End: 2022-01-18

## 2022-01-18 DIAGNOSIS — F40.01 AGORAPHOBIA WITH PANIC ATTACKS: ICD-10-CM

## 2022-01-18 DIAGNOSIS — F32.A DEPRESSION, UNSPECIFIED DEPRESSION TYPE: ICD-10-CM

## 2022-01-18 RX ORDER — FLUOXETINE HYDROCHLORIDE 40 MG/1
40 CAPSULE ORAL DAILY
Qty: 30 CAPSULE | Refills: 2 | Status: SHIPPED | OUTPATIENT
Start: 2022-01-18 | End: 2022-01-31 | Stop reason: SDUPTHER

## 2022-01-18 NOTE — TELEPHONE ENCOUNTER
Patient notified and he has not seen the Psych doctor recently and could not remember if he did the labs?

## 2022-01-18 NOTE — TELEPHONE ENCOUNTER
He is asking for an increase on his Prozac. He has been taking 20 mg daily and this is not strong enough?

## 2022-01-31 ENCOUNTER — TELEPHONE (OUTPATIENT)
Dept: PRIMARY CARE CLINIC | Age: 22
End: 2022-01-31

## 2022-01-31 ENCOUNTER — OFFICE VISIT (OUTPATIENT)
Dept: PRIMARY CARE CLINIC | Age: 22
End: 2022-01-31
Payer: COMMERCIAL

## 2022-01-31 VITALS
TEMPERATURE: 98.1 F | RESPIRATION RATE: 18 BRPM | BODY MASS INDEX: 36.88 KG/M2 | OXYGEN SATURATION: 98 % | SYSTOLIC BLOOD PRESSURE: 138 MMHG | HEART RATE: 75 BPM | HEIGHT: 67 IN | DIASTOLIC BLOOD PRESSURE: 74 MMHG | WEIGHT: 235 LBS

## 2022-01-31 DIAGNOSIS — F43.21 GRIEF: ICD-10-CM

## 2022-01-31 DIAGNOSIS — F32.A DEPRESSION, UNSPECIFIED DEPRESSION TYPE: Primary | ICD-10-CM

## 2022-01-31 DIAGNOSIS — F32.A DEPRESSION, UNSPECIFIED DEPRESSION TYPE: ICD-10-CM

## 2022-01-31 DIAGNOSIS — F40.01 AGORAPHOBIA WITH PANIC ATTACKS: ICD-10-CM

## 2022-01-31 PROCEDURE — 99214 OFFICE O/P EST MOD 30 MIN: CPT | Performed by: INTERNAL MEDICINE

## 2022-01-31 RX ORDER — FLUOXETINE HYDROCHLORIDE 40 MG/1
40 CAPSULE ORAL DAILY
Qty: 30 CAPSULE | Refills: 2 | Status: SHIPPED | OUTPATIENT
Start: 2022-01-31

## 2022-01-31 RX ORDER — HYDROXYZINE PAMOATE 25 MG/1
25 CAPSULE ORAL 3 TIMES DAILY PRN
Qty: 30 CAPSULE | Refills: 1 | Status: SHIPPED | OUTPATIENT
Start: 2022-01-31

## 2022-01-31 ASSESSMENT — COLUMBIA-SUICIDE SEVERITY RATING SCALE - C-SSRS
1. WITHIN THE PAST MONTH, HAVE YOU WISHED YOU WERE DEAD OR WISHED YOU COULD GO TO SLEEP AND NOT WAKE UP?: YES
6. HAVE YOU EVER DONE ANYTHING, STARTED TO DO ANYTHING, OR PREPARED TO DO ANYTHING TO END YOUR LIFE?: NO
2. HAVE YOU ACTUALLY HAD ANY THOUGHTS OF KILLING YOURSELF?: NO

## 2022-01-31 ASSESSMENT — PATIENT HEALTH QUESTIONNAIRE - PHQ9
2. FEELING DOWN, DEPRESSED OR HOPELESS: 2
SUM OF ALL RESPONSES TO PHQ QUESTIONS 1-9: 20
SUM OF ALL RESPONSES TO PHQ QUESTIONS 1-9: 18
7. TROUBLE CONCENTRATING ON THINGS, SUCH AS READING THE NEWSPAPER OR WATCHING TELEVISION: 2
4. FEELING TIRED OR HAVING LITTLE ENERGY: 3
8. MOVING OR SPEAKING SO SLOWLY THAT OTHER PEOPLE COULD HAVE NOTICED. OR THE OPPOSITE, BEING SO FIGETY OR RESTLESS THAT YOU HAVE BEEN MOVING AROUND A LOT MORE THAN USUAL: 3
6. FEELING BAD ABOUT YOURSELF - OR THAT YOU ARE A FAILURE OR HAVE LET YOURSELF OR YOUR FAMILY DOWN: 3
5. POOR APPETITE OR OVEREATING: 3
10. IF YOU CHECKED OFF ANY PROBLEMS, HOW DIFFICULT HAVE THESE PROBLEMS MADE IT FOR YOU TO DO YOUR WORK, TAKE CARE OF THINGS AT HOME, OR GET ALONG WITH OTHER PEOPLE: 3
SUM OF ALL RESPONSES TO PHQ QUESTIONS 1-9: 20
9. THOUGHTS THAT YOU WOULD BE BETTER OFF DEAD, OR OF HURTING YOURSELF: 2
SUM OF ALL RESPONSES TO PHQ QUESTIONS 1-9: 20
3. TROUBLE FALLING OR STAYING ASLEEP: 2

## 2022-01-31 ASSESSMENT — ENCOUNTER SYMPTOMS
VOMITING: 0
DIARRHEA: 0
COUGH: 0
ABDOMINAL PAIN: 0
WHEEZING: 0
NAUSEA: 0
SHORTNESS OF BREATH: 0

## 2022-01-31 NOTE — TELEPHONE ENCOUNTER
----- Message from Sydney Meier sent at 1/31/2022  3:18 PM EST -----  Subject: Medication Problem    QUESTIONS  Name of Medication? FLUoxetine (PROZAC) 40 MG capsule  Patient-reported dosage and instructions? 40 MG capsule, Take 1 capsule by   mouth daily  What question or problem do you have with the medication? refill was not   sent to pharmacy  Preferred Pharmacy? [facility] [address],   [facility] [phone] - F [phone]  Pharmacy phone number (if available)? [phone]  Additional Information for Provider?   ---------------------------------------------------------------------------  --------------  [address]  What is the best way for the office to contact you? OK to leave message on   voicemail  Preferred Call Back Phone Number? [phone]  ---------------------------------------------------------------------------  --------------  SCRIPT ANSWERS  Relationship to Patient?  Self

## 2022-01-31 NOTE — TELEPHONE ENCOUNTER
----- Message from Simona Akins sent at 1/28/2022 11:13 AM EST -----  Subject: Appointment Request    Reason for Call: Urgent Mental Health    QUESTIONS  Type of Appointment? Established Patient  Reason for appointment request? No appointments available during search  Additional Information for Provider? Pt would like to make appt.   ---------------------------------------------------------------------------  --------------  CALL BACK INFO  What is the best way for the office to contact you? OK to leave message on   voicemail  Preferred Call Back Phone Number? [phone]  ---------------------------------------------------------------------------  --------------  SCRIPT ANSWERS  Relationship to Patient? Self  Are you having thoughts of hurting yourself or others? No  Is this a follow up of prior diagnosis? No  Have you been diagnosed with, awaiting test results for, or told that you   are suspected of having COVID-19 (Coronavirus)? (If patient has tested   negative or was tested as a requirement for work, school, or travel and   not based on symptoms, answer no)? No  Within the past two weeks have you developed any of the following symptoms   (answer no if symptoms have been present longer than 2 weeks or began   more than 2 weeks ago)? Fever or Chills, Cough, Shortness of breath or   difficulty breathing, Loss of taste or smell, Sore throat, Nasal   congestion, Sneezing or runny nose, Fatigue or generalized body aches   (answer no if pain is specific to a body part e.g. back pain), Diarrhea,   Headache? No  Have you had close contact with someone with COVID-19 in the last 14 days? No  (Service Expert  click yes below to proceed with [facility]   Scheduling)?  Yes

## 2022-01-31 NOTE — PROGRESS NOTES
Subjective:      Patient ID: Jasmin Lazo is a 19 y.o. male    Depression and anxiety x 10 days   HPI  Pt presents for follow up regarding management of anxiety and depression. Yet to commence Prozac 40mg (still on 20mg). Depression and anxiety are uncontrolled due to recent loss of a close loved one and miscarriage of brother's child. Increased panic attacks. Past Medical History:   Diagnosis Date    (PE) tubes X 5 9/2/2015    Acne vulgaris 10/14/2015    AGE (acute gastroenteritis), Resolved 6/30/2014    Allergic rhinitis 5/19/2014    Depression 10/14/2015    Eczema 5/19/2014    Headache 3/16/2016    LVH (left ventricular hypertrophy) 5/19/2014    LVH, Cardiac w/u neg. 5/19/2014    Nodule, ? Small Abscess 3/3/2015    Noncompliance, hasn't made appt w/ Psychiatry yet.  Refuses antidepressants 11/9/2017    Noncompliance, hasn't picked up Lexapro yet or made appt w/ Psychiatry yet 10/21/2015    Patent pressure equalisation (PE) tube, left 9/3/2015    Scrotal mass, normal now w/ Normal exam 3/16/2016     Past Surgical History:   Procedure Laterality Date    COLONOSCOPY  12/31/2014    DR Ang Mckay     Social History     Socioeconomic History    Marital status: Single     Spouse name: Not on file    Number of children: Not on file    Years of education: Not on file    Highest education level: Not on file   Occupational History    Not on file   Tobacco Use    Smoking status: Current Every Day Smoker    Smokeless tobacco: Never Used   Substance and Sexual Activity    Alcohol use: No    Drug use: No    Sexual activity: Yes     Partners: Female   Other Topics Concern    Not on file   Social History Narrative    Not on file     Social Determinants of Health     Financial Resource Strain: Low Risk     Difficulty of Paying Living Expenses: Not hard at all   Food Insecurity: No Food Insecurity    Worried About Running Out of Food in the Last Year: Never true    [place] of Food in the Last Year: Never true   Transportation Needs: No Transportation Needs    Lack of Transportation (Medical): No    Lack of Transportation (Non-Medical): No   Physical Activity:     Days of Exercise per Week: Not on file    Minutes of Exercise per Session: Not on file   Stress:     Feeling of Stress : Not on file   Social Connections:     Frequency of Communication with Friends and Family: Not on file    Frequency of Social Gatherings with Friends and Family: Not on file    Attends Mosque Services: Not on file    Active Member of [address] [place] [address] [employer] or Organizations: Not on file    Attends Club or Organization Meetings: Not on file    Marital Status: Not on file   Intimate Partner Violence:     Fear of Current or Ex-Partner: Not on file    Emotionally Abused: Not on file    Physically Abused: Not on file    Sexually Abused: Not on file   Housing Stability:     Unable to Pay for Housing in the Last Year: Not on file    Number of [place] in the Last Year: Not on file    Unstable Housing in the Last Year: Not on file     History reviewed. No pertinent family history. Allergies:  Lactose intolerance (gi) and Penicillins  Patient Active Problem List   Diagnosis    Allergic rhinitis    Eczema    LVH, Cardiac w/u neg.  AGE (acute gastroenteritis), Resolved    Nodule, ? Small Abscess    (PE) tubes X 5    Patent pressure equalisation (PE) tube, left    Acne vulgaris    Depression    Scrotal mass, normal now w/ Normal exam    Headache    Noncompliance, hasn't made appt w/ Psychiatry yet. Refuses antidepressants     Current Outpatient Medications on File Prior to Visit   Medication Sig Dispense Refill    FLUoxetine (PROZAC) 40 MG capsule Take 1 capsule by mouth daily 30 capsule 2     No current facility-administered medications on file prior to visit. Review of Systems   Constitutional: Negative for chills, diaphoresis, fatigue and fever. Respiratory: Negative for cough, shortness of breath and wheezing. Cardiovascular: Negative for chest pain. Gastrointestinal: Negative for abdominal pain, diarrhea, nausea and vomiting. Endocrine: Negative for cold intolerance and heat intolerance. Genitourinary: Negative for dysuria and frequency. Neurological: Negative for dizziness and light-headedness. Psychiatric/Behavioral: Positive for agitation and dysphoric mood. Negative for sleep disturbance and suicidal ideas. The patient is not nervous/anxious. Objective:   /74   Pulse 75   Temp 98.1 °F (36.7 °C)   Resp 18   Ht 5' 7\" (1.702 m)   Wt 235 lb (106.6 kg)   SpO2 98%   BMI 36.81 kg/m²     Physical Exam  Constitutional:       General: He is not in acute distress. Appearance: He is not diaphoretic. Cardiovascular:      Rate and Rhythm: Normal rate and regular rhythm. Pulses: Normal pulses. Heart sounds: Normal heart sounds, S1 normal and S2 normal.   Pulmonary:      Effort: Pulmonary effort is normal. No respiratory distress. Breath sounds: Normal breath sounds. No wheezing or rales. Chest:      Chest wall: No tenderness. Abdominal:      General: Bowel sounds are normal.      Tenderness: There is no abdominal tenderness. Neurological:      General: No focal deficit present. Mental Status: He is alert. Cranial Nerves: No cranial nerve deficit. Assessment:       Diagnosis Orders   1. Depression, unspecified depression type Inadequately Controlled hydrOXYzine (VISTARIL) 25 MG capsule    recent loss. Will commence Prozac 40mg   will return to his therapit    2. Grief  hydrOXYzine (VISTARIL) 25 MG capsule       Plan:      No orders of the defined types were placed in this encounter. Orders Placed This Encounter   Medications    hydrOXYzine (VISTARIL) 25 MG capsule     Sig: Take 1 capsule by mouth 3 times daily as needed for Anxiety     Dispense:  30 capsule     Refill:  1     Return in about 2 weeks (around 2/14/2022) for follow up, with PCP.

## 2022-02-02 ENCOUNTER — NURSE TRIAGE (OUTPATIENT)
Dept: OTHER | Facility: CLINIC | Age: 22
End: 2022-02-02

## 2022-02-02 ENCOUNTER — TELEPHONE (OUTPATIENT)
Dept: PRIMARY CARE CLINIC | Age: 22
End: 2022-02-02

## 2022-02-02 NOTE — TELEPHONE ENCOUNTER
----- Message from April Nagle sent at 2/2/2022 10:06 AM EST -----  Subject: Message to Provider    QUESTIONS  Information for Provider? Patient was seen by Dr. Claudeen Lone on 1/31 and forgot   to ask for a return to work, as he was out of work all last week. The fax   number to his work is [phone]. please call patient back to advise   when done, thank you   ---------------------------------------------------------------------------  --------------  CALL BACK INFO  What is the best way for the office to contact you? OK to leave message on   voicemail  Preferred Call Back Phone Number? [phone]  ---------------------------------------------------------------------------  --------------  SCRIPT ANSWERS  Relationship to Patient?  Self

## 2022-02-02 NOTE — TELEPHONE ENCOUNTER
Received call from Jhonnie Simmonds at [facility] with [employer]. Subjective: Caller states \"I've been dealing with anxiety and depression since I was a teenager. I tried to kill myself when I was 13. Lately in the last year it's been harder to deal with. It's been passive thoughts, where I could absent-mindedley walk into my room and blow myself up with my pistol. \" I gave my roomate my gun. It's in a locked box. I haven't driven in the last few days. \"     Patient states roommate is in room, but did not answer when caller knocked on door    Current Symptoms: Suicidal thoughts, NO plan currently, NO current suicide attempt  Having depression/anxiety, having anxiety attacks- not able to go to work because of them    Patient's mother got to his home while on the call and states she will take him to the ED Now. Onset: 1 month ago; worsening    Associated Symptoms: reduced activity    Pain Severity: 0/10; N/A; none    Temperature: NA    What has been tried: Unknown    LMP: NA Pregnant: NA    Recommended disposition: Go to ED Now. Patient and mother agreeable. Care advice provided, patient verbalizes understanding; denies any other questions or concerns; instructed to call back for any new or worsening symptoms. Patient/caller proceeding to [facility] Emergency Department. Attention Provider: Thank you for allowing me to participate in the care of your patient. The patient was connected to triage in response to information provided to the ECC/PSC. Please do not respond through this encounter as the response is not directed to a shared pool. Reason for Disposition   Depression symptoms (sadness, hopelessness, decreased energy) and unable to do any normal activities (e.g., self care, school, work; in [place] to baseline).     Protocols used: SUICIDE CONCERNS-ADULT-[place]

## 2022-12-19 ENCOUNTER — OFFICE VISIT (OUTPATIENT)
Dept: PRIMARY CARE CLINIC | Age: 22
End: 2022-12-19
Payer: COMMERCIAL

## 2022-12-19 VITALS
DIASTOLIC BLOOD PRESSURE: 70 MMHG | TEMPERATURE: 98.4 F | WEIGHT: 210 LBS | HEIGHT: 67 IN | SYSTOLIC BLOOD PRESSURE: 120 MMHG | BODY MASS INDEX: 32.96 KG/M2

## 2022-12-19 DIAGNOSIS — Z11.4 ENCOUNTER FOR SCREENING FOR HIV: ICD-10-CM

## 2022-12-19 DIAGNOSIS — E78.5 HYPERLIPIDEMIA, UNSPECIFIED HYPERLIPIDEMIA TYPE: ICD-10-CM

## 2022-12-19 DIAGNOSIS — R73.9 HYPERGLYCEMIA: ICD-10-CM

## 2022-12-19 DIAGNOSIS — B08.4 HAND, FOOT AND MOUTH DISEASE: Primary | ICD-10-CM

## 2022-12-19 DIAGNOSIS — Z00.00 PREVENTATIVE HEALTH CARE: ICD-10-CM

## 2022-12-19 DIAGNOSIS — R21 RASH: ICD-10-CM

## 2022-12-19 DIAGNOSIS — E03.9 HYPOTHYROIDISM, UNSPECIFIED TYPE: ICD-10-CM

## 2022-12-19 DIAGNOSIS — F98.8 ATTENTION DEFICIT DISORDER (ADD) IN ADULT: ICD-10-CM

## 2022-12-19 LAB
ALBUMIN SERPL-MCNC: 4.9 G/DL (ref 3.5–4.6)
ALP BLD-CCNC: 61 U/L (ref 35–104)
ALT SERPL-CCNC: 30 U/L (ref 0–41)
ANION GAP SERPL CALCULATED.3IONS-SCNC: 15 MEQ/L (ref 9–15)
AST SERPL-CCNC: 27 U/L (ref 0–40)
BASOPHILS ABSOLUTE: 0 K/UL (ref 0–0.2)
BASOPHILS RELATIVE PERCENT: 0.9 %
BILIRUB SERPL-MCNC: 0.7 MG/DL (ref 0.2–0.7)
BUN BLDV-MCNC: 9 MG/DL (ref 6–20)
CALCIUM SERPL-MCNC: 9.9 MG/DL (ref 8.5–9.9)
CHLORIDE BLD-SCNC: 103 MEQ/L (ref 95–107)
CHOLESTEROL, FASTING: 178 MG/DL (ref 0–199)
CO2: 25 MEQ/L (ref 20–31)
CREAT SERPL-MCNC: 0.66 MG/DL (ref 0.7–1.2)
EOSINOPHILS ABSOLUTE: 0.2 K/UL (ref 0–0.7)
EOSINOPHILS RELATIVE PERCENT: 3.5 %
GFR SERPL CREATININE-BSD FRML MDRD: >60 ML/MIN/{1.73_M2}
GLOBULIN: 3.2 G/DL (ref 2.3–3.5)
GLUCOSE BLD-MCNC: 100 MG/DL (ref 70–99)
HBA1C MFR BLD: 5.1 % (ref 4.8–5.9)
HCT VFR BLD CALC: 43 % (ref 42–52)
HDLC SERPL-MCNC: 44 MG/DL (ref 40–59)
HEMOGLOBIN: 14.6 G/DL (ref 14–18)
LDL CHOLESTEROL CALCULATED: 119 MG/DL (ref 0–129)
LYMPHOCYTES ABSOLUTE: 1.6 K/UL (ref 1–4.8)
LYMPHOCYTES RELATIVE PERCENT: 29.8 %
MCH RBC QN AUTO: 30.8 PG (ref 27–31.3)
MCHC RBC AUTO-ENTMCNC: 33.8 % (ref 33–37)
MCV RBC AUTO: 91.1 FL (ref 79–92.2)
MONOCYTES ABSOLUTE: 0.4 K/UL (ref 0.2–0.8)
MONOCYTES RELATIVE PERCENT: 8 %
NEUTROPHILS ABSOLUTE: 3 K/UL (ref 1.4–6.5)
NEUTROPHILS RELATIVE PERCENT: 57.8 %
PDW BLD-RTO: 12.6 % (ref 11.5–14.5)
PLATELET # BLD: 256 K/UL (ref 130–400)
POTASSIUM SERPL-SCNC: 5 MEQ/L (ref 3.4–4.9)
RBC # BLD: 4.72 M/UL (ref 4.7–6.1)
SODIUM BLD-SCNC: 143 MEQ/L (ref 135–144)
TOTAL PROTEIN: 8.1 G/DL (ref 6.3–8)
TRIGLYCERIDE, FASTING: 77 MG/DL (ref 0–150)
TSH REFLEX: 1.25 UIU/ML (ref 0.44–3.86)
WBC # BLD: 5.2 K/UL (ref 4.8–10.8)

## 2022-12-19 PROCEDURE — 99214 OFFICE O/P EST MOD 30 MIN: CPT | Performed by: INTERNAL MEDICINE

## 2022-12-19 RX ORDER — LANOLIN ALCOHOL/MO/W.PET/CERES
6 CREAM (GRAM) TOPICAL
COMMUNITY
Start: 2022-02-08

## 2022-12-19 RX ORDER — DEXTROAMPHETAMINE SACCHARATE, AMPHETAMINE ASPARTATE MONOHYDRATE, DEXTROAMPHETAMINE SULFATE AND AMPHETAMINE SULFATE 5; 5; 5; 5 MG/1; MG/1; MG/1; MG/1
20 CAPSULE, EXTENDED RELEASE ORAL EVERY MORNING
Qty: 30 CAPSULE | Refills: 0 | Status: SHIPPED | OUTPATIENT
Start: 2022-12-19 | End: 2023-01-18

## 2022-12-19 RX ORDER — MOMETASONE FUROATE 1 MG/G
CREAM TOPICAL
Qty: 50 G | Refills: 2 | Status: SHIPPED | OUTPATIENT
Start: 2022-12-19

## 2022-12-19 SDOH — ECONOMIC STABILITY: FOOD INSECURITY: WITHIN THE PAST 12 MONTHS, YOU WORRIED THAT YOUR FOOD WOULD RUN OUT BEFORE YOU GOT MONEY TO BUY MORE.: NEVER TRUE

## 2022-12-19 SDOH — ECONOMIC STABILITY: FOOD INSECURITY: WITHIN THE PAST 12 MONTHS, THE FOOD YOU BOUGHT JUST DIDN'T LAST AND YOU DIDN'T HAVE MONEY TO GET MORE.: NEVER TRUE

## 2022-12-19 ASSESSMENT — PATIENT HEALTH QUESTIONNAIRE - PHQ9
SUM OF ALL RESPONSES TO PHQ9 QUESTIONS 1 & 2: 0
7. TROUBLE CONCENTRATING ON THINGS, SUCH AS READING THE NEWSPAPER OR WATCHING TELEVISION: 0
2. FEELING DOWN, DEPRESSED OR HOPELESS: 0
4. FEELING TIRED OR HAVING LITTLE ENERGY: 0
5. POOR APPETITE OR OVEREATING: 0
3. TROUBLE FALLING OR STAYING ASLEEP: 0
SUM OF ALL RESPONSES TO PHQ QUESTIONS 1-9: 0
6. FEELING BAD ABOUT YOURSELF - OR THAT YOU ARE A FAILURE OR HAVE LET YOURSELF OR YOUR FAMILY DOWN: 0
SUM OF ALL RESPONSES TO PHQ QUESTIONS 1-9: 0
1. LITTLE INTEREST OR PLEASURE IN DOING THINGS: 0
10. IF YOU CHECKED OFF ANY PROBLEMS, HOW DIFFICULT HAVE THESE PROBLEMS MADE IT FOR YOU TO DO YOUR WORK, TAKE CARE OF THINGS AT HOME, OR GET ALONG WITH OTHER PEOPLE: 0
SUM OF ALL RESPONSES TO PHQ QUESTIONS 1-9: 0
8. MOVING OR SPEAKING SO SLOWLY THAT OTHER PEOPLE COULD HAVE NOTICED. OR THE OPPOSITE, BEING SO FIGETY OR RESTLESS THAT YOU HAVE BEEN MOVING AROUND A LOT MORE THAN USUAL: 0
SUM OF ALL RESPONSES TO PHQ QUESTIONS 1-9: 0
9. THOUGHTS THAT YOU WOULD BE BETTER OFF DEAD, OR OF HURTING YOURSELF: 0

## 2022-12-19 ASSESSMENT — ENCOUNTER SYMPTOMS
TROUBLE SWALLOWING: 0
SORE THROAT: 1
NAUSEA: 0
PHOTOPHOBIA: 0
VOICE CHANGE: 0
VOMITING: 0
CHOKING: 0
SHORTNESS OF BREATH: 0

## 2022-12-19 ASSESSMENT — SOCIAL DETERMINANTS OF HEALTH (SDOH): HOW HARD IS IT FOR YOU TO PAY FOR THE VERY BASICS LIKE FOOD, HOUSING, MEDICAL CARE, AND HEATING?: NOT HARD AT ALL

## 2022-12-19 NOTE — PROGRESS NOTES
Mikel Gr 23 y.o. male presents today with   Chief Complaint   Patient presents with    Annual Exam    Rash     Onset 12/15/2022- sx headaches, sinus pressure, dehydration. Then Saturday woke up with red dots all over feet later that day moved to hands. Itchy and discolor. Stating he hasn't changed any product. ADHD     Having a hard time focusing at times, doesn't think he has ever got tested to see if he has ADHD. Mostly having a hard at school       Rash  The current episode started in the past 7 days. The problem has been gradually improving since onset. The affected locations include the right foot, left foot, left hand and right hand. Rash characteristics: vesical. Associated symptoms include a fever (2 days) and a sore throat. Pertinent negatives include no fatigue, shortness of breath or vomiting. ADHD  This is a recurrent problem. The current episode started more than 1 year ago. The problem occurs daily. The problem has been waxing and waning. Associated symptoms include a fever (2 days), a rash and a sore throat. Pertinent negatives include no chest pain, fatigue, nausea or vomiting. Past Medical History:   Diagnosis Date    (PE) tubes X 5 09/02/2015    Acne vulgaris 10/14/2015    AGE (acute gastroenteritis), Resolved 06/30/2014    Allergic rhinitis 05/19/2014    Attention deficit disorder (ADD) in adult     dr River Pizano    Depression 10/14/2015    Eczema 05/19/2014    Headache 03/16/2016    LVH (left ventricular hypertrophy) 05/19/2014    LVH, Cardiac w/u neg. 05/19/2014    Nodule, ? Small Abscess 03/03/2015    Noncompliance, hasn't made appt w/ Psychiatry yet.  Refuses antidepressants 11/09/2017    Noncompliance, hasn't picked up Lexapro yet or made appt w/ Psychiatry yet 10/21/2015    Patent pressure equalisation (PE) tube, left 09/03/2015    Scrotal mass, normal now w/ Normal exam 03/16/2016     Patient Active Problem List    Diagnosis Date Noted    Noncompliance, hasn't made appt w/ Psychiatry yet. Refuses antidepressants 11/09/2017    Scrotal mass, normal now w/ Normal exam 03/16/2016    Headache 03/16/2016    Acne vulgaris 10/14/2015    Depression 10/14/2015    Patent pressure equalisation (PE) tube, left 09/03/2015    (PE) tubes X 5 09/02/2015    Nodule, ? Small Abscess 03/03/2015    AGE (acute gastroenteritis), Resolved 06/30/2014    Allergic rhinitis 05/19/2014    Eczema 05/19/2014    LVH, Cardiac w/u neg. 05/19/2014     Past Surgical History:   Procedure Laterality Date    COLONOSCOPY  12/31/2014    DR Cris Davidson     No family history on file. Social History     Socioeconomic History    Marital status: Single     Spouse name: None    Number of children: None    Years of education: None    Highest education level: None   Tobacco Use    Smoking status: Every Day    Smokeless tobacco: Never   Substance and Sexual Activity    Alcohol use: No    Drug use: No    Sexual activity: Yes     Partners: Female     Social Determinants of Health     Financial Resource Strain: Low Risk     Difficulty of Paying Living Expenses: Not hard at all   Food Insecurity: No Food Insecurity    Worried About Running Out of Food in the Last Year: Never true    Ran Out of Food in the Last Year: Never true     Allergies   Allergen Reactions    Lactose Intolerance (Gi) Diarrhea    Penicillins Rash       Review of Systems   Constitutional:  Positive for fever (2 days). Negative for fatigue. HENT:  Positive for sore throat. Negative for trouble swallowing and voice change. Eyes:  Negative for photophobia and visual disturbance. Respiratory:  Negative for choking and shortness of breath. Cardiovascular:  Negative for chest pain and palpitations. Gastrointestinal:  Negative for nausea and vomiting. Genitourinary:  Negative for decreased urine volume, testicular pain and urgency. Skin:  Positive for rash. Neurological:  Negative for tremors and syncope. Hematological:  Does not bruise/bleed easily. Psychiatric/Behavioral:  Negative for suicidal ideas. Vitals:    12/19/22 0937 12/19/22 1023   BP: (!) 150/78 120/70   Site: Left Upper Arm    Position: Sitting    Cuff Size: Medium Adult    Temp: 98.4 °F (36.9 °C)    Weight: 210 lb (95.3 kg)    Height: 5' 7\" (1.702 m)        Physical Exam  Constitutional:       Appearance: He is well-developed. HENT:      Head: Normocephalic. Eyes:      Conjunctiva/sclera: Conjunctivae normal.   Cardiovascular:      Rate and Rhythm: Regular rhythm. Pulmonary:      Effort: Pulmonary effort is normal. No respiratory distress. Breath sounds: No wheezing. Abdominal:      General: There is no distension. Musculoskeletal:         General: Normal range of motion. Cervical back: Normal range of motion. Skin:     Coloration: Skin is not jaundiced. Neurological:      Mental Status: He is alert. Assessment/Plan  Alana Carreon was seen today for annual exam, rash and adhd. Diagnoses and all orders for this visit:    Hand, foot and mouth disease  -     mometasone (ELOCON) 0.1 % cream; Apply topically daily. Attention deficit disorder (ADD) in adult  -     amphetamine-dextroamphetamine (ADDERALL XR) 20 MG extended release capsule; Take 1 capsule by mouth every morning for 30 days. -     [address], [place]    Rash  -     CBC with Auto Differential; Future  -     Rpr; Future  -     mometasone (ELOCON) 0.1 % cream; Apply topically daily. Preventative health care  -     Hemoglobin A1C; Future  -     Lipid, Fasting; Future  -     CBC with Auto Differential; Future  -     Comprehensive Metabolic Panel; Future    Hypothyroidism, unspecified type  -     TSH with Reflex; Future    Hyperlipidemia, unspecified hyperlipidemia type  -     Lipid, Fasting; Future    Encounter for screening for HIV  -     HIV Screen;  Future    Hyperglycemia  -     Hemoglobin A1C; Future      Return in about 3 months (around 3/19/2023), or if symptoms worsen or fail to improve.     Liz Perdomo MD

## 2022-12-20 LAB
HIV AG/AB: NONREACTIVE
RPR: NORMAL

## 2023-01-06 ENCOUNTER — OFFICE VISIT (OUTPATIENT)
Dept: BEHAVIORAL/MENTAL HEALTH CLINIC | Age: 23
End: 2023-01-06

## 2023-01-06 DIAGNOSIS — F12.20 CANNABIS USE DISORDER, MODERATE, DEPENDENCE (HCC): ICD-10-CM

## 2023-01-06 DIAGNOSIS — F40.10 SOCIAL ANXIETY DISORDER: ICD-10-CM

## 2023-01-06 DIAGNOSIS — R41.840 DIFFICULTY CONCENTRATING: Primary | ICD-10-CM

## 2023-01-06 DIAGNOSIS — F32.A DEPRESSION, UNSPECIFIED DEPRESSION TYPE: ICD-10-CM

## 2023-01-06 ASSESSMENT — ANXIETY QUESTIONNAIRES
3. WORRYING TOO MUCH ABOUT DIFFERENT THINGS: 2
1. FEELING NERVOUS, ANXIOUS, OR ON EDGE: 2
7. FEELING AFRAID AS IF SOMETHING AWFUL MIGHT HAPPEN: 1
IF YOU CHECKED OFF ANY PROBLEMS ON THIS QUESTIONNAIRE, HOW DIFFICULT HAVE THESE PROBLEMS MADE IT FOR YOU TO DO YOUR WORK, TAKE CARE OF THINGS AT HOME, OR GET ALONG WITH OTHER PEOPLE: VERY DIFFICULT
2. NOT BEING ABLE TO STOP OR CONTROL WORRYING: 1
4. TROUBLE RELAXING: 2
6. BECOMING EASILY ANNOYED OR IRRITABLE: 1
5. BEING SO RESTLESS THAT IT IS HARD TO SIT STILL: 1
GAD7 TOTAL SCORE: 10

## 2023-01-06 ASSESSMENT — PATIENT HEALTH QUESTIONNAIRE - PHQ9
6. FEELING BAD ABOUT YOURSELF - OR THAT YOU ARE A FAILURE OR HAVE LET YOURSELF OR YOUR FAMILY DOWN: 0
1. LITTLE INTEREST OR PLEASURE IN DOING THINGS: 1
SUM OF ALL RESPONSES TO PHQ QUESTIONS 1-9: 8
5. POOR APPETITE OR OVEREATING: 3
7. TROUBLE CONCENTRATING ON THINGS, SUCH AS READING THE NEWSPAPER OR WATCHING TELEVISION: 1
8. MOVING OR SPEAKING SO SLOWLY THAT OTHER PEOPLE COULD HAVE NOTICED. OR THE OPPOSITE, BEING SO FIGETY OR RESTLESS THAT YOU HAVE BEEN MOVING AROUND A LOT MORE THAN USUAL: 0
9. THOUGHTS THAT YOU WOULD BE BETTER OFF DEAD, OR OF HURTING YOURSELF: 0
3. TROUBLE FALLING OR STAYING ASLEEP: 1
SUM OF ALL RESPONSES TO PHQ9 QUESTIONS 1 & 2: 2
4. FEELING TIRED OR HAVING LITTLE ENERGY: 1
2. FEELING DOWN, DEPRESSED OR HOPELESS: 1
10. IF YOU CHECKED OFF ANY PROBLEMS, HOW DIFFICULT HAVE THESE PROBLEMS MADE IT FOR YOU TO DO YOUR WORK, TAKE CARE OF THINGS AT HOME, OR GET ALONG WITH OTHER PEOPLE: 1
SUM OF ALL RESPONSES TO PHQ QUESTIONS 1-9: 8

## 2023-01-06 NOTE — PROGRESS NOTES
Behavioral Health Consultation  Leopoldo Sins, PsyD, [facility]  Psychologist  1/6/23  10:20 AM EST      Time spent with Patient: 30 minutes  This is patient's first  [facility] appointment (with this provider). Reason for Consult:  ADHD Evaluation  Referring Provider: Belinda Max MD    Patient provided informed consent for the behavioral health program. Discussed with patient model of service to include the limits of confidentiality (i.e. abuse reporting, suicide intervention, etc.) and short-term intervention focused approach vs traditional counseling/psychotherapy. Discussed that this evaluation is not for the purposes of determination of competency, disability determination, custody or parental fitness, presurgical clearance, or for any forensic determination; nor are [facility] services typically sufficient for any court-ordered treatment requirements. Further, it was discussed with patient that specific evaluation of certain conditions (e.g., ADHD, LD, cognitive impairment, memory issues, etc.) may necessitate referral to a specialty mental health provider in the community for formal psychometric/neuropsychological testing and evaluation, which cannot be performed through [facility] services. Patient indicated understanding. Feedback given to PCP. S:  The patient was raised in [place], [place] by his biological parents. Patient has 4 siblings, and patient is the youngest child. Patient denied any history of childhood abuse or trauma. The patient has a high school level of education and is currently enrolled in classes at [employer]; he noted this is his third attempt at college. Patient is currently employed at a liquor store. Patient has never been  but noted he has been engaged in the past; he has no children. The patient currently resides with a roommate. Patient reported a history of past outpatient mental health treatment; he last participated in treatment in February 2022.  Patient also had an initial [facility] visit with Dr. Vlad Floyd in 2019, resulting in a diagnosis of Persistent Depressive Disorder with anxious distress. Patient reported a history of past suicide attempts (via OD) at age 11, and noted a recent (Feb 2022) history of suicidal ideation with subsequent voluntary inpatient psychiatric treatment. Patient explained that he was working for [employer] at the time and was overwhelmed with work demands; stated he became paranoid, highly anxious, and felt extremely isolated - all of which reportedly stopped once patient left the job. Patient is currently prescribed Adderall XR by his PCP to treat symptoms of suspected ADHD; patient was also prescribed Vistaril and Prozac but noted he discontinued the medications 9-10 months ago. Patient described their mood as \"apathetic. \" The patient reported their sleep as \"hit or miss;\" they sleep approximately 5-6 hours per 24-hour period, on average. Family history of mental health issues includes: \"My grandmother on my mother's side was either bipolar or had major depression. \"    Patient acknowledged use of alcohol; \"A couple beers a night, but I don't drink to get drunk. \" Patient does not use nicotine products, having quit 1 month ago. Patient reports using marijuana daily. The patient denied any other substance use and misuse of prescription medication. There is no known family history of substance abuse. Presenting Problem:   Referred for evaluation of possible diagnosis of ADHD; has been prescribed Adderall XR 20 mg by PCP. No childhood diagnosis of ADHD; said his parents did not believe in it. \"I can never focus, if I try to read it becomes a chore and it's like everything just blurs, I can't find motivation to do mundane tasks; I just kind of want to sit there and exist.\"    Jeff Ross he first experienced depression at age 13; states currently, doesn't feel depression is problematic.   Reports experiencing social anxiety and identifies use of marijuana as \"self-medication\" to relieve this      O:  MSE:    Appearance:  Alert, appropriately dressed, well groomed, well-appearing  Behavior:  Cooperative and Pleasant  Appetite:  abnormal  Sleep disturbance:  Yes  Fatigue:  Yes  Loss of pleasure:  Yes  Impulsive behavior:  No  Speech:  spontaneous, normal rate, and normal volume  Mood:  \"Apathetic\"  Affect:  Constricted  Thought Process:  Goal-Directed  Thought Content:  intact  Associations:  logical connections  Insight:  fair   Judgement:  fair  Orientation:  oriented to person, place, time, and general circumstances  Memory:  recent and remote memory intact  Attention/Concentration:  impaired  Morbid ideation:  No  Suicide Assessment:  no suicidal ideation      History:    Medications:   Current Outpatient Medications   Medication Sig Dispense Refill    melatonin 3 MG TABS tablet Take 6 mg by mouth At bedtime as needed      mometasone (ELOCON) 0.1 % cream Apply topically daily. 50 g 2    amphetamine-dextroamphetamine (ADDERALL XR) 20 MG extended release capsule Take 1 capsule by mouth every morning for 30 days. 30 capsule 0    hydrOXYzine (VISTARIL) 25 MG capsule Take 1 capsule by mouth 3 times daily as needed for Anxiety (Patient not taking: Reported on 12/19/2022) 30 capsule 1    FLUoxetine (PROZAC) 40 MG capsule Take 1 capsule by mouth daily (Patient not taking: Reported on 12/19/2022) 30 capsule 2     No current facility-administered medications for this visit.        Social History:   Social History     Socioeconomic History    Marital status: Single     Spouse name: Not on file    Number of children: Not on file    Years of education: Not on file    Highest education level: Not on file   Occupational History    Not on file   Tobacco Use    Smoking status: Every Day    Smokeless tobacco: Never   Substance and Sexual Activity    Alcohol use: No    Drug use: No    Sexual activity: Yes     Partners: Female   Other Topics Concern    Not on file   Social History Narrative    Not on file     Social Determinants of Health     Financial Resource Strain: Low Risk     Difficulty of Paying Living Expenses: Not hard at all   Food Insecurity: No Food Insecurity    Worried About Running Out of Food in the Last Year: Never true    Ran Out of Food in the Last Year: Never true   Transportation Needs: Not on file   Physical Activity: Not on file   Stress: Not on file   Social Connections: Not on file   Intimate Partner Violence: Not on file   Housing Stability: Not on file       TOBACCO:   reports that he has been smoking. He has never used smokeless tobacco.  ETOH:   reports no history of alcohol use. Family History:   No family history on file. A:  Administered the PHQ-9, which indicates a self report of mild depression and associated symptom distress. Patient was administered the CHALO-7, which indicates a self report of moderate anxiety. Discussed concern related to [facility] use while taking Adderall, as well as possible impact of THC on attention, concentration, and memory. PHQ Scores 1/6/2023 12/19/2022 1/31/2022 7/26/2019 9/25/2017   PHQ2 Score 2 0 - 3 4   PHQ9 Score 8 0 20 12 12     Interpretation of Total Score Depression Severity: 1-4 = Minimal depression, 5-9 = Mild depression, 10-14 = Moderate depression, 15-19 = Moderately severe depression, 20-27 = Severe depression      CHALO 7 SCORE 1/6/2023   CHALO-7 Total Score 10     Interpretation of CHALO-7 score: 5-9 = mild anxiety, 10-14 = moderate anxiety, 15+ = severe anxiety. Recommend referral to behavioral health for scores 10 or greater. Diagnosis:    1. Difficulty concentrating    2. Social anxiety disorder    3. Depression, unspecified depression type    4.  Cannabis use disorder, moderate, dependence (HCC)       R/O ADHD     R/O PDD     R/O MDD     R/O CHALO        Diagnosis Date    (PE) tubes X 5 09/02/2015    Acne vulgaris 10/14/2015    AGE (acute gastroenteritis), Resolved 06/30/2014    Allergic rhinitis 05/19/2014 Attention deficit disorder (ADD) in adult     dr Ester Salas    Depression 10/14/2015    Eczema 05/19/2014    Headache 03/16/2016    LVH (left ventricular hypertrophy) 05/19/2014    LVH, Cardiac w/u neg. 05/19/2014    Nodule, ? Small Abscess 03/03/2015    Noncompliance, hasn't made appt w/ Psychiatry yet. Refuses antidepressants 11/09/2017    Noncompliance, hasn't picked up Lexapro yet or made appt w/ Psychiatry yet 10/21/2015    Patent pressure equalisation (PE) tube, left 09/03/2015    Scrotal mass, normal now w/ Normal exam 03/16/2016           Plan:  Return in about 3 weeks (around 1/27/2023). Pt interventions:  Established rapport, Conducted functional assessment, [place]-setting to identify patient's primary goals for [facility] visit/overall health, Supportive techniques, and Provided Psychoeducation re: [facility] services, ADHD assessment process, administered the ASRS, CAARS:S-L, MDQ, and [place] scales as first steps in the ADHD assessment process. Pt Behavioral Change Plan:  Please complete and return the below forms before your next visit    Follow up as scheduled         Please note this report has been partially produced using speech recognition software and may cause contain errors related to that system including grammar, punctuation, and spelling, as well as words and phrases that may seem inappropriate. If there are questions or concerns please feel free to contact me to clarify.

## 2023-01-06 NOTE — PATIENT INSTRUCTIONS
*Please complete and return the below forms before your next visit. Below are two copies of the same measure- please complete one of the measures about yourself for the past 6 months or more. Please have someone who knows you very well complete the measure about you for the past 6 months or more.                                                                   Please complete the below measure about you when you were school aged (elementary through high school) as best you remember:
Equal and normal pulses (carotid, femoral, dorsalis pedis)

## 2023-01-23 ENCOUNTER — TELEPHONE (OUTPATIENT)
Dept: PRIMARY CARE CLINIC | Age: 23
End: 2023-01-23

## 2023-01-24 DIAGNOSIS — F98.8 ATTENTION DEFICIT DISORDER (ADD) IN ADULT: ICD-10-CM

## 2023-01-24 RX ORDER — DEXTROAMPHETAMINE SACCHARATE, AMPHETAMINE ASPARTATE MONOHYDRATE, DEXTROAMPHETAMINE SULFATE AND AMPHETAMINE SULFATE 5; 5; 5; 5 MG/1; MG/1; MG/1; MG/1
20 CAPSULE, EXTENDED RELEASE ORAL EVERY MORNING
Qty: 30 CAPSULE | Refills: 0 | Status: SHIPPED | OUTPATIENT
Start: 2023-01-24 | End: 2023-02-23

## 2023-02-03 ENCOUNTER — OFFICE VISIT (OUTPATIENT)
Dept: BEHAVIORAL/MENTAL HEALTH CLINIC | Age: 23
End: 2023-02-03
Payer: COMMERCIAL

## 2023-02-03 DIAGNOSIS — F12.20 CANNABIS USE DISORDER, MODERATE, DEPENDENCE (HCC): ICD-10-CM

## 2023-02-03 DIAGNOSIS — F40.10 SOCIAL ANXIETY DISORDER: ICD-10-CM

## 2023-02-03 DIAGNOSIS — F32.A DEPRESSION, UNSPECIFIED DEPRESSION TYPE: ICD-10-CM

## 2023-02-03 DIAGNOSIS — R41.840 DIFFICULTY CONCENTRATING: Primary | ICD-10-CM

## 2023-02-03 PROCEDURE — 90832 PSYTX W PT 30 MINUTES: CPT | Performed by: PSYCHOLOGIST

## 2023-02-03 ASSESSMENT — PATIENT HEALTH QUESTIONNAIRE - PHQ9
8. MOVING OR SPEAKING SO SLOWLY THAT OTHER PEOPLE COULD HAVE NOTICED. OR THE OPPOSITE, BEING SO FIGETY OR RESTLESS THAT YOU HAVE BEEN MOVING AROUND A LOT MORE THAN USUAL: 0
SUM OF ALL RESPONSES TO PHQ QUESTIONS 1-9: 9
3. TROUBLE FALLING OR STAYING ASLEEP: 1
10. IF YOU CHECKED OFF ANY PROBLEMS, HOW DIFFICULT HAVE THESE PROBLEMS MADE IT FOR YOU TO DO YOUR WORK, TAKE CARE OF THINGS AT HOME, OR GET ALONG WITH OTHER PEOPLE: 1
SUM OF ALL RESPONSES TO PHQ QUESTIONS 1-9: 9
9. THOUGHTS THAT YOU WOULD BE BETTER OFF DEAD, OR OF HURTING YOURSELF: 0
5. POOR APPETITE OR OVEREATING: 2
SUM OF ALL RESPONSES TO PHQ QUESTIONS 1-9: 9
SUM OF ALL RESPONSES TO PHQ QUESTIONS 1-9: 9
SUM OF ALL RESPONSES TO PHQ9 QUESTIONS 1 & 2: 2
6. FEELING BAD ABOUT YOURSELF - OR THAT YOU ARE A FAILURE OR HAVE LET YOURSELF OR YOUR FAMILY DOWN: 1
1. LITTLE INTEREST OR PLEASURE IN DOING THINGS: 1
2. FEELING DOWN, DEPRESSED OR HOPELESS: 1
7. TROUBLE CONCENTRATING ON THINGS, SUCH AS READING THE NEWSPAPER OR WATCHING TELEVISION: 2
4. FEELING TIRED OR HAVING LITTLE ENERGY: 1

## 2023-02-03 ASSESSMENT — ANXIETY QUESTIONNAIRES
7. FEELING AFRAID AS IF SOMETHING AWFUL MIGHT HAPPEN: 1
GAD7 TOTAL SCORE: 10
6. BECOMING EASILY ANNOYED OR IRRITABLE: 2
3. WORRYING TOO MUCH ABOUT DIFFERENT THINGS: 1
5. BEING SO RESTLESS THAT IT IS HARD TO SIT STILL: 2
IF YOU CHECKED OFF ANY PROBLEMS ON THIS QUESTIONNAIRE, HOW DIFFICULT HAVE THESE PROBLEMS MADE IT FOR YOU TO DO YOUR WORK, TAKE CARE OF THINGS AT HOME, OR GET ALONG WITH OTHER PEOPLE: SOMEWHAT DIFFICULT
2. NOT BEING ABLE TO STOP OR CONTROL WORRYING: 1
4. TROUBLE RELAXING: 2
1. FEELING NERVOUS, ANXIOUS, OR ON EDGE: 1

## 2023-02-03 NOTE — Clinical Note
Likely positive for ADHD, but also positive screen on MDQ and daily use of marijuana. As such, recommend referral for formal assessment for diagnostic clarity.   One such option is:  [address] [facility] [address] [place]. [address] [facility], [address] [place], [address] [facility] [phone]

## 2023-02-03 NOTE — PROGRESS NOTES
Behavioral Health Consultation  Adama Caban PsyD, [facility]  Psychologist  2/3/23  8:37 AM EST      Time spent with Patient: 30 minutes  This is patient's second  [facility] appointment. Reason for Consult:  ADHD Evaluation  Referring Provider: Jordan Bergman MD    Patient provided informed consent for the behavioral health program. Discussed with patient model of service to include the limits of confidentiality (i.e. abuse reporting, suicide intervention, etc.) and short-term intervention focused approach vs traditional counseling/psychotherapy. Discussed that this evaluation is not for the purposes of determination of competency, disability determination, custody or parental fitness, presurgical clearance, or for any forensic determination; nor are [facility] services typically sufficient for any court-ordered treatment requirements. Further, it was discussed with patient that specific evaluation of certain conditions (e.g., ADHD, LD, cognitive impairment, memory issues, etc.) may necessitate referral to a specialty mental health provider in the community for formal psychometric/neuropsychological testing and evaluation, which cannot be performed through [facility] services. Patient indicated understanding. Feedback given to PCP. S:  Patient returns to complete assessment for possible diagnosis of ADHD. Mood: \"Better\"  Sleep: \"Okay, not great\" - notes some difficulty related to awakening during the night with difficulty returning to sleep  Appetite: \"Not great\"    Continues taking Adderall 20 mg as prescribed by his PCP; notes he has not been procrastinating with tasks, which has reduced his overall stress level. Continues with daily use of marijuana; says smokes THC \"to take the edge off things, so I don't get so irritable with people and go off on them. \"    Patient also states he wants \"to be tested for Autism;\" said he took a bunch of online tests and thinks this may be an issue but would like formal testing to confirm. Presenting Problem:   Referred for evaluation of possible diagnosis of ADHD; has been prescribed Adderall XR 20 mg by PCP. No childhood diagnosis of ADHD; said his parents did not believe in it. \"I can never focus, if I try to read it becomes a chore and it's like everything just blurs, I can't find motivation to do mundane tasks; I just kind of want to sit there and exist.\"     David Sharpe he first experienced depression at age 13; states currently, doesn't feel depression is problematic. Reports experiencing social anxiety and identifies use of marijuana as \"self-medication\" to relieve this      O:  MSE:    Appearance:  Alert, appropriately dressed, well groomed, well-appearing  Behavior:  Cooperative and Pleasant  Appetite:  \"Not great\"  Sleep disturbance:  Yes; notes some difficulty related to awakening during the night with difficulty returning to sleep  Fatigue:  Yes  Loss of pleasure:  Yes  Impulsive behavior: At times  Speech:  spontaneous, normal rate, and normal volume  Mood:  \"Better\"  Affect:  Neutral/Euthymic(normal)  Thought Process:  Goal-Directed  Thought Content:  intact  Associations:  logical connections  Insight:  fair   Judgement:  fair  Orientation:  oriented to person, place, time, and general circumstances  Memory:  recent and remote memory intact  Attention/Concentration:  impaired  Morbid ideation:  No  Suicide Assessment:  no suicidal ideation      History:    Medications:   Current Outpatient Medications   Medication Sig Dispense Refill    amphetamine-dextroamphetamine (ADDERALL XR) 20 MG extended release capsule Take 1 capsule by mouth every morning for 30 days. 30 capsule 0    melatonin 3 MG TABS tablet Take 6 mg by mouth At bedtime as needed      mometasone (ELOCON) 0.1 % cream Apply topically daily.  50 g 2    hydrOXYzine (VISTARIL) 25 MG capsule Take 1 capsule by mouth 3 times daily as needed for Anxiety (Patient not taking: Reported on 12/19/2022) 30 capsule 1 FLUoxetine (PROZAC) 40 MG capsule Take 1 capsule by mouth daily (Patient not taking: Reported on 12/19/2022) 30 capsule 2     No current facility-administered medications for this visit. Social History:   Social History     Socioeconomic History    Marital status: Single     Spouse name: Not on file    Number of children: Not on file    Years of education: Not on file    Highest education level: Not on file   Occupational History    Not on file   Tobacco Use    Smoking status: Every Day    Smokeless tobacco: Never   Substance and Sexual Activity    Alcohol use: No    Drug use: No    Sexual activity: Yes     Partners: Female   Other Topics Concern    Not on file   Social History Narrative    Not on file     Social Determinants of Health     Financial Resource Strain: Low Risk     Difficulty of Paying Living Expenses: Not hard at all   Food Insecurity: No Food Insecurity    Worried About Running Out of Food in the Last Year: Never true    Ran Out of Food in the Last Year: Never true   Transportation Needs: Not on file   Physical Activity: Not on file   Stress: Not on file   Social Connections: Not on file   Intimate Partner Violence: Not on file   Housing Stability: Not on file       TOBACCO:   reports that he has been smoking. He has never used smokeless tobacco.  ETOH:   reports no history of alcohol use. Family History:   No family history on file. A:  Administered the PHQ-9, which indicates a self report of mild depression and symptom distress. Patient was administered the CHALO-7, which indicates a self report of moderate anxiety. Patient would benefit from [facility] services to increase coping skills to provide symptom management/control/relief.        PHQ Scores 2/3/2023 1/6/2023 12/19/2022 1/31/2022 7/26/2019 9/25/2017   PHQ2 Score 2 2 0 - 3 4   PHQ9 Score 9 8 0 20 12 12     Interpretation of Total Score Depression Severity: 1-4 = Minimal depression, 5-9 = Mild depression, 10-14 = Moderate depression, 15-19 = Moderately severe depression, 20-27 = Severe depression      CHALO 7 SCORE 2/3/2023 1/6/2023   CHALO-7 Total Score 10 10     Interpretation of CHALO-7 score: 5-9 = mild anxiety, 10-14 = moderate anxiety, 15+ = severe anxiety. Recommend referral to behavioral health for scores 10 or greater. Mood Disorder Questionnaire (MDQ)  Reviewed the patient's responses on the Mood Disorder Questionnaire (MDQ), which is a screener for bipolar disorder. The patient endorsed 12 (out of 13) symptoms associated with bipolar disorder (7 required for positive screen), occurring during the same period of time, and resulting in moderate problems (impairment in functioning and/or negative consequences). Patient's responses produced a positive screen on the measure, suggesting the possible presence of bipolar disorder or similar disorder of mood. DSM 5 Diagnostic Interview  ADHD    A. Inattention inclusion: Requires a pattern of behavior, with onset before age 15 years, that is present in multiple settings and gives rise to social, educational, or work performance difficulties. The symptoms must be persistently present for at least 6 months to a degree inconsistent with developmental level. The disorder is manifested by at least six (five for adolescents and adults age 16+) of the following symptoms of inattention. Overlooks details: Over at least the last 6 months, have other people told you that you often overlook or miss details, or that you made careless mistakes in your work? No      Examples:  \"I'm very detail oriented\"  Task inattention: Do you often have difficulty staying focused on a task or activity, such as reading a lengthy writing or listening to a lecture or conversation? Yes   Examples:  \"Last night, I had to go through our discussion board and respond to other people's post for my online class and it probably took me an hour because I kept getting distracted and doing something else. \"  Appears not to listen: Do other people tell you that when they speak to you, your mind often seems to be elsewhere or that it seems like you are not listening? Yes   Examples:  \"I still listen and retain everything they are saying, but I don't make eye contact or respond in a way that would be considered typical.\"  Fails to finish tasks: Do you often struggle to finish schoolwork, chores, or work assignments because you lose focus or are easily sidetracked? Yes   Examples:  (see above)  Difficulty organizing tasks: Do you find it difficult to organize tasks or activities? Do you struggle with time management or fail to meet deadlines? Sometimes   Examples: \"If it's something for myself, yes; if it's something for someone else, no. If I'm at work, I get things done but if it's just a project for me at home, it takes me forever. \"  Avoids tasks requiring sustained mental activity: Do you often avoid tasks that require sustained mental effort? Yes   Examples:  \"I don't like cleaning, because when I do I have to think about where they go or where I got it from in the first place. \"  Often loses things necessary for tasks: Do you often lose things that are essential for tasks or activities, such as school materials, books, tools, wallets, keys, paperwork, eyeglasses, or your phone? No    Easily distracted: Do you find that you are often easily distracted by things or thoughts unrelated to the activity or task your are supposed to be doing? Yes   Examples:  (see above)  Often forgetful: Do you find, or do other peopled find, that you are often forgetful in your daily activities? Yes   Examples:  \"I can be talking with someone and lose my train of thought, sometimes mid-sentence. I always forget to do laundry; I'll start jumping to another task and then another, without getting the other things done, and forget to go back to do the others. \"    B.   Hyperactivity/impulsivity inclusion: Alternatively, requires the presence of at least six of the following manifestations of hyperactivity and impulsivity over the same course. Fidgets: Over the last 6 months, have you often found yourself fidgeting with your hands or feet? Do you find it hard to sit without squirming? Yes   Examples:  (endorsed and observed)  Leaves seat: When you are in a situation where you are expected to sit, do you often leave your seat? No    Runs or climbs: Do you often find yourself running around or climbing in a situation where doing so is inappropriate? (Note: In adolescents or adults, may be limited to feeling restless.)   No    Unable to maintain quiet: Do you often find yourself unable to play or engage in leisure activities quietly? Yes   Examples:  \"Whenever I'm trying to relax and play video games with my friends at the end of the day, even though I may not really be interested in playing it. \"  **did not really respond to content of question, even when repeated and clarified  Hyperactivity: Do you often feel as if you are, or do other people describe you as always being, on the go or acting as if you were driven by a motor ? Do you find it uncomfortable to sit still for an extended time? Yes   Examples:  \"I'm always moving, doing something; even when relaxing at the end of the night playing video games, I'm always getting up and doing something. \"  Talks excessively: Do you often talk excessively? Yes   Examples:  \"I don't shut up at work when [employer] with my coworkers. \"  Blurts answers: Do you often struggle to wait your turn in a conversation? Do you often complete other peoples sentences or blurt out an answer before a question has been completed? Yes   Examples: \"Yeah, I do that a lot; if I know what they are asking I just go and answer\"  Struggles to take turns: Do you often have difficulty waiting your turn or waiting in line? No    Interrupts or intrudes: Do you often butt into other peoples activities, conversations, or games?  No   Do you often start using other peoples things without permission? No     C. Exclusion: If the criteria are not met in two or more settings, or there is no evidence that the symptoms interfere with functioning, the symptoms occur only in the context of a psychotic disorder, or the symptoms are better explained by another mental disorder, do not make the diagnosis. D.  Modifiers  Specifiers:  Combined presentation: If both inattention and hyperactivity-impulsivity criteria are met for the past 6 months. Predominantly inattentive presentation: If inattention criteria are met but hyperactivity-impulsivity criteria have not been met for the past 6 months. Predominantly hyperactive/impulsive presentation: If hyperactivity-impulsivity criteria are met and inattention criteria have not been met for the past 6 months. Specifiers: In partial remission  Severity:  Mild: Few, if any, symptoms in excess of those required to make the diagnosis are present, and symptoms result in no more than minor impairments in social or occupational functioning. Moderate: Symptoms or functional impairment between mild and severe is present. Severe: Many symptoms in excess of those required to make the diagnosis, or several symptoms that are particularly severe, are present, or the symptoms result in marked impairment in social or occupational functioning. E.  Alternatives: if a person is experiencing subthreshold symptoms or you have not yet had sufficient opportunity to verify all criteria, consider other specified or unspecified attention-deficit/hyperactivity disorder. The symptoms must be associated with impairment and do not occur exclusively during the course of schizophrenia or another psychotic disorder and are not better explained by another mental disorder.      * It is notable that patient was on medication (Adderall) for ADHD at the time of completing this form, current medication regimen may account for low endorsement of items impacting functioning. The patient was provided with two copies of the Adult ADHD Self-Report Scale (ASRS), a self-report measure of the prevalence and impact of common symptoms of ADHD; one copy was completed by the patient, and the second by his friend in reference to the patient. The ASRS scores (5;5) suggest the presence of symptoms consistent with ADHD. Patient was administered the [facility] Adult ADHD Rating Scales - Self-Report: Long Version (CAARS-S:L), which is a measure of adult ADHD-related symptoms and behaviors. Validity scales did not? indicate problems with inconsistent responding. Scores indicate significant problems related to inattention/memory and problems with self-concept, as well as possible significant (mildly atypical) problems related to hyperactivity/restlessness. The patients DSM-5 Inattentive Symptoms subscale indicated significant tendencies associated with the inattentive subtype of ADHD. The patients DSM-5 Hyperactive-Impulsive Symptoms subscale was also markedly atypical and suggestive of significant problems consistent with a hyperactive-impulsive presentation of ADHD. The patient's DSM-5 ADHD Symptoms Total score is markedly atypical, suggesting patient likely meets diagnostic criteria for ADHD, as presented in the DSM-5. The patient's ADHD index score is moderately atypical and suggests reason for concern when compared to other adults. Patient was also administered the [place] Rating Scale [facility]), which is used to retroactively assess ADHD symptoms. A cutoff score of 46 on 25 questions associated with ADHD is recommended to determine if symptoms are consistent with ADHD. The patient's score was 78, which suggests the presence of clinically significant symptoms associated with ADHD during childhood. The patient was administered the DSM-V diagnostic interview as part of the ADHD evaluation process.   Patient endorsed 6 of the 9 Inattentive symptom criteria. Patient also endorsed 5 of the 9 Hyperactive and Impulsive symptom criteria. Based on these responses, along with self-reported symptoms on the ASRS, CAARS-S:L, and [place], reported impairment in current functioning related to reported symptoms, and clinical observations throughout the assessment process on two separate occasions, the patient appears to meet diagnostic criteria of ADHD, combined inattentive-hyperactive type. However, given the patient's report of ongoing moderate anxiety, a positive screening on the MDQ, and daily use of marijuana, a diagnosis of ADHD cannot be made at this time; recommendation is that patient be referred for formal psychometric assessment and/or neuropsychological evaluation for diagnostic clarity. Diagnosis:    1. Difficulty concentrating    2. Social anxiety disorder    3. Depression, unspecified depression type    4. Cannabis use disorder, moderate, dependence (HCC)       R/O ADHD     R/O Bipolar Disorder     R/O PDD     R/O MDD     R/O CHALO     R/O ASD           Diagnosis Date    (PE) tubes X 5 09/02/2015    Acne vulgaris 10/14/2015    AGE (acute gastroenteritis), Resolved 06/30/2014    Allergic rhinitis 05/19/2014    Attention deficit disorder (ADD) in adult     dr Fortino Roca    Depression 10/14/2015    Eczema 05/19/2014    Headache 03/16/2016    LVH (left ventricular hypertrophy) 05/19/2014    LVH, Cardiac w/u neg. 05/19/2014    Nodule, ? Small Abscess 03/03/2015    Noncompliance, hasn't made appt w/ Psychiatry yet. Refuses antidepressants 11/09/2017    Noncompliance, hasn't picked up Lexapro yet or made appt w/ Psychiatry yet 10/21/2015    Patent pressure equalisation (PE) tube, left 09/03/2015    Scrotal mass, normal now w/ Normal exam 03/16/2016           Plan:  Return if symptoms worsen or fail to improve.      Pt interventions:  Provided education on the use of medication to treat  ADHD, Discussed various factors related to the development and maintenance of  ADHD (including biological, cognitive, behavioral, and environmental factors), Conducted functional assessment, [place]-setting to identify patient's primary goals for [facility] visit/overall health, Supportive techniques and Provided Psychoeducation re: ADHD assessment process, administered, scored and interpreted self-report measures of ADHD, mood, and the DSM 5 Diagnostic Clinical Interview for ADHD. Pt Behavioral Change Plan:  Recommend patient be referred for formal psychometric assessment and/or neuropsychological evaluation for diagnostic clarity. Please note this report has been partially produced using speech recognition software and may cause contain errors related to that system including grammar, punctuation, and spelling, as well as words and phrases that may seem inappropriate. If there are questions or concerns please feel free to contact me to clarify.

## 2023-03-08 DIAGNOSIS — F98.8 ATTENTION DEFICIT DISORDER (ADD) IN ADULT: ICD-10-CM

## 2023-03-08 RX ORDER — DEXTROAMPHETAMINE SACCHARATE, AMPHETAMINE ASPARTATE MONOHYDRATE, DEXTROAMPHETAMINE SULFATE AND AMPHETAMINE SULFATE 5; 5; 5; 5 MG/1; MG/1; MG/1; MG/1
20 CAPSULE, EXTENDED RELEASE ORAL EVERY MORNING
Qty: 30 CAPSULE | Refills: 0 | Status: SHIPPED | OUTPATIENT
Start: 2023-03-08 | End: 2023-04-07

## 2023-03-08 RX ORDER — DEXTROAMPHETAMINE SACCHARATE, AMPHETAMINE ASPARTATE MONOHYDRATE, DEXTROAMPHETAMINE SULFATE AND AMPHETAMINE SULFATE 5; 5; 5; 5 MG/1; MG/1; MG/1; MG/1
20 CAPSULE, EXTENDED RELEASE ORAL EVERY MORNING
Qty: 30 CAPSULE | Refills: 0 | Status: CANCELLED | OUTPATIENT
Start: 2023-03-08 | End: 2023-04-07

## 2023-03-21 ENCOUNTER — OFFICE VISIT (OUTPATIENT)
Dept: PRIMARY CARE CLINIC | Age: 23
End: 2023-03-21
Payer: COMMERCIAL

## 2023-03-21 VITALS
HEART RATE: 78 BPM | OXYGEN SATURATION: 98 % | WEIGHT: 202 LBS | SYSTOLIC BLOOD PRESSURE: 120 MMHG | BODY MASS INDEX: 31.71 KG/M2 | HEIGHT: 67 IN | DIASTOLIC BLOOD PRESSURE: 86 MMHG

## 2023-03-21 DIAGNOSIS — Z51.81 THERAPEUTIC DRUG MONITORING: ICD-10-CM

## 2023-03-21 DIAGNOSIS — F31.9 BIPOLAR 1 DISORDER (HCC): ICD-10-CM

## 2023-03-21 DIAGNOSIS — F98.8 ATTENTION DEFICIT DISORDER (ADD) IN ADULT: Primary | ICD-10-CM

## 2023-03-21 DIAGNOSIS — F32.89 OTHER DEPRESSION: ICD-10-CM

## 2023-03-21 PROCEDURE — 99213 OFFICE O/P EST LOW 20 MIN: CPT | Performed by: INTERNAL MEDICINE

## 2023-03-21 RX ORDER — DEXTROAMPHETAMINE SACCHARATE, AMPHETAMINE ASPARTATE MONOHYDRATE, DEXTROAMPHETAMINE SULFATE AND AMPHETAMINE SULFATE 5; 5; 5; 5 MG/1; MG/1; MG/1; MG/1
20 CAPSULE, EXTENDED RELEASE ORAL EVERY MORNING
Qty: 30 CAPSULE | Refills: 0 | Status: SHIPPED | OUTPATIENT
Start: 2023-04-08 | End: 2023-05-08

## 2023-03-21 SDOH — ECONOMIC STABILITY: FOOD INSECURITY: WITHIN THE PAST 12 MONTHS, YOU WORRIED THAT YOUR FOOD WOULD RUN OUT BEFORE YOU GOT MONEY TO BUY MORE.: NEVER TRUE

## 2023-03-21 SDOH — ECONOMIC STABILITY: INCOME INSECURITY: HOW HARD IS IT FOR YOU TO PAY FOR THE VERY BASICS LIKE FOOD, HOUSING, MEDICAL CARE, AND HEATING?: NOT HARD AT ALL

## 2023-03-21 SDOH — ECONOMIC STABILITY: FOOD INSECURITY: WITHIN THE PAST 12 MONTHS, THE FOOD YOU BOUGHT JUST DIDN'T LAST AND YOU DIDN'T HAVE MONEY TO GET MORE.: NEVER TRUE

## 2023-03-21 SDOH — ECONOMIC STABILITY: HOUSING INSECURITY
IN THE LAST 12 MONTHS, WAS THERE A TIME WHEN YOU DID NOT HAVE A STEADY PLACE TO SLEEP OR SLEPT IN A SHELTER (INCLUDING NOW)?: NO

## 2023-03-21 ASSESSMENT — ENCOUNTER SYMPTOMS
CHOKING: 0
VOICE CHANGE: 0
PHOTOPHOBIA: 0
TROUBLE SWALLOWING: 0
SHORTNESS OF BREATH: 0

## 2023-03-21 NOTE — LETTER
lung disease. Overdose or dangerous interactions with alcohol and other medications may occur, leading to death. Hyperalgesia may develop, which means patients receiving opioids for the treatment of pain may become more sensitive to certain painful stimuli, and in some cases, experience pain from ordinarily non-painful stimuli. Women between the ages of 14-53 who could become pregnant should carefully weigh the risks and benefits of opioids with their physicians, as these medications increase the risk of pregnancy complications, including miscarriage,  delivery and stillbirth. It is also possible for babies to be born addicted to opioids. Opioid dependence withdrawal symptoms may include; feelings of uneasiness, increased pain, irritability, belly pain, diarrhea, sweats and goose-flesh. Benzodiazepines and non-benzodiazepine sleep medications: These medications can lead to problems such as addiction/dependence, sedation, fatigue, lightheadedness, dizziness, incoordination, falls, depression, hallucinations, and impaired judgment, memory and concentration. The ability to drive and operate machinery may also be affected. Abnormal sleep-related behaviors have been reported, including sleepwalking, driving, making telephone calls, eating, or having sex while not fully awake. These medications can suppress breathing and worsen sleep apnea, particularly when combined with alcohol or other sedating medications, potentially leading to death. Dependence withdrawal symptoms may include tremors, anxiety, hallucinations and seizures. Stimulants:  Common adverse effects include addiction/dependence, increased blood  pressure and heart rate, decreased appetite, nausea, involuntary weight loss, insomnia,                                                                                                                     Initials:_______   irritability, and headaches.   These risks may increase when these

## 2023-03-21 NOTE — PROGRESS NOTES
Whitney Choi 23 y.o. male presents today with   Chief Complaint   Patient presents with    3 Month Follow-Up    ADHD    Medication Refill       ADHD  This is a recurrent problem. The current episode started more than 1 year ago. The problem occurs daily. The problem has been waxing and waning. Treatments tried: feeling better on the adderal. and off the prozac. Mental Health Problem  The primary symptoms include dysphoric mood and somatic symptoms. The current episode started more than 1 month ago. This is a recurrent problem. The onset of the illness is precipitated by emotional stress and a stressful event. The degree of incapacity that he is experiencing as a consequence of his illness is moderate. Additional symptoms of the illness include anhedonia, insomnia, agitation, attention impairment, distractible and poor judgment. Past Medical History:   Diagnosis Date    (PE) tubes X 5 09/02/2015    Acne vulgaris 10/14/2015    AGE (acute gastroenteritis), Resolved 06/30/2014    Allergic rhinitis 05/19/2014    Attention deficit disorder (ADD) in adult     dr Jimbo Chen    Depression 10/14/2015    Eczema 05/19/2014    Headache 03/16/2016    LVH (left ventricular hypertrophy) 05/19/2014    LVH, Cardiac w/u neg. 05/19/2014    Nodule, ? Small Abscess 03/03/2015    Noncompliance, hasn't made appt w/ Psychiatry yet. Refuses antidepressants 11/09/2017    Noncompliance, hasn't picked up Lexapro yet or made appt w/ Psychiatry yet 10/21/2015    Patent pressure equalisation (PE) tube, left 09/03/2015    Scrotal mass, normal now w/ Normal exam 03/16/2016     Patient Active Problem List    Diagnosis Date Noted    Noncompliance, hasn't made appt w/ Psychiatry yet.  Refuses antidepressants 11/09/2017    Scrotal mass, normal now w/ Normal exam 03/16/2016    Headache 03/16/2016    Acne vulgaris 10/14/2015    Depression 10/14/2015    Patent pressure equalisation (PE) tube, left 09/03/2015    (PE) tubes X 5 09/02/2015    Nodule,

## 2023-03-24 LAB
6MAM UR QL: NOT DETECTED
7-AMINOCLONAZEPAM: NOT DETECTED
ALPHA-OH-ALPRAZOLAM: NOT DETECTED
ALPHA-OH-MIDAZOLAM, URINE: NOT DETECTED
ALPRAZOLAM: NOT DETECTED
AMPHET UR QL SCN: PRESENT
BARBITURATES: NOT DETECTED
BENZOYLECGONINE: NOT DETECTED
BUPRENORPHINE: NOT DETECTED
CARISOPRODOL UR QL: NOT DETECTED
CLONAZEPAM UR QL: NOT DETECTED
CODEINE: NOT DETECTED
CREAT UR-MCNC: 184.9 MG/DL (ref 20–400)
DIAZEPAM: NOT DETECTED
ETHYL GLUCURONIDE: PRESENT
FENTANYL UR QL: NOT DETECTED
GABAPENTIN: NOT DETECTED
HYDROCODONE UR QL: NOT DETECTED
HYDROMORPHONE: NOT DETECTED
LORAZEPAM UR QL: NOT DETECTED
MARIJUANA METABOLITE: PRESENT
MDA: NOT DETECTED
MDEA: NOT DETECTED
MDMA UR QL: NOT DETECTED
MEPERIDINE: NOT DETECTED
METHADONE: NOT DETECTED
METHAMPHETAMINE: NOT DETECTED
METHYLPHENIDATE: NOT DETECTED
MIDAZOLAM UR QL SCN: NOT DETECTED
MORPHINE: NOT DETECTED
NALOXONE: NOT DETECTED
NORBUPRENORPHINE, FREE: NOT DETECTED
NORDIAZEPAM: NOT DETECTED
NORFENTANYL: NOT DETECTED
NORHYDROCODONE, URINE: NOT DETECTED
NOROXYCODONE: NOT DETECTED
NOROXYMORPHONE, URINE: NOT DETECTED
OXAZEPAM UR QL: NOT DETECTED
OXYCODONE UR QL: NOT DETECTED
OXYMORPHONE UR QL: NOT DETECTED
PAIN MANAGEMENT DRUG PANEL: NORMAL
PATHOLOGY STUDY: NORMAL
PCP: NOT DETECTED
PHENTERMINE: NOT DETECTED
PREGABALIN: NOT DETECTED
TAPENTADOL, URINE: NOT DETECTED
TAPENTADOL-O-SULFATE, URINE: NOT DETECTED
TEMAZEPAM: NOT DETECTED
TRAMADOL: NOT DETECTED
ZOLPIDEM: NOT DETECTED

## 2023-04-18 ENCOUNTER — OFFICE VISIT (OUTPATIENT)
Dept: PRIMARY CARE CLINIC | Age: 23
End: 2023-04-18
Payer: COMMERCIAL

## 2023-04-18 ENCOUNTER — TELEPHONE (OUTPATIENT)
Dept: PRIMARY CARE CLINIC | Age: 23
End: 2023-04-18

## 2023-04-18 VITALS
BODY MASS INDEX: 31.74 KG/M2 | SYSTOLIC BLOOD PRESSURE: 122 MMHG | WEIGHT: 202.2 LBS | DIASTOLIC BLOOD PRESSURE: 84 MMHG | OXYGEN SATURATION: 99 % | HEIGHT: 67 IN | HEART RATE: 57 BPM

## 2023-04-18 DIAGNOSIS — F17.200 SMOKER: ICD-10-CM

## 2023-04-18 DIAGNOSIS — F98.8 ATTENTION DEFICIT DISORDER (ADD) IN ADULT: Primary | ICD-10-CM

## 2023-04-18 PROCEDURE — 99213 OFFICE O/P EST LOW 20 MIN: CPT | Performed by: INTERNAL MEDICINE

## 2023-04-18 RX ORDER — DEXTROAMPHETAMINE SACCHARATE, AMPHETAMINE ASPARTATE, DEXTROAMPHETAMINE SULFATE AND AMPHETAMINE SULFATE 5; 5; 5; 5 MG/1; MG/1; MG/1; MG/1
20 TABLET ORAL DAILY
Qty: 30 TABLET | Refills: 0 | Status: SHIPPED | OUTPATIENT
Start: 2023-04-18 | End: 2023-05-18

## 2023-04-18 RX ORDER — DEXTROAMPHETAMINE SACCHARATE, AMPHETAMINE ASPARTATE, DEXTROAMPHETAMINE SULFATE AND AMPHETAMINE SULFATE 5; 5; 5; 5 MG/1; MG/1; MG/1; MG/1
20 TABLET ORAL DAILY
Qty: 30 TABLET | Refills: 0 | Status: SHIPPED | OUTPATIENT
Start: 2023-05-18 | End: 2023-06-17

## 2023-04-18 RX ORDER — DEXTROAMPHETAMINE SACCHARATE, AMPHETAMINE ASPARTATE, DEXTROAMPHETAMINE SULFATE AND AMPHETAMINE SULFATE 5; 5; 5; 5 MG/1; MG/1; MG/1; MG/1
20 TABLET ORAL DAILY
Qty: 30 TABLET | Refills: 0 | Status: SHIPPED | OUTPATIENT
Start: 2023-06-17 | End: 2023-07-17

## 2023-04-18 RX ORDER — NICOTINE 21 MG/24HR
1 PATCH, TRANSDERMAL 24 HOURS TRANSDERMAL EVERY 24 HOURS
Qty: 30 PATCH | Refills: 0 | Status: SHIPPED | OUTPATIENT
Start: 2023-04-18 | End: 2023-05-18

## 2023-04-18 ASSESSMENT — ENCOUNTER SYMPTOMS
TROUBLE SWALLOWING: 0
SHORTNESS OF BREATH: 0
CHOKING: 0
ABDOMINAL DISTENTION: 0
VOICE CHANGE: 0
PHOTOPHOBIA: 0